# Patient Record
Sex: MALE | Race: WHITE | NOT HISPANIC OR LATINO | Employment: OTHER | URBAN - METROPOLITAN AREA
[De-identification: names, ages, dates, MRNs, and addresses within clinical notes are randomized per-mention and may not be internally consistent; named-entity substitution may affect disease eponyms.]

---

## 2017-04-07 DIAGNOSIS — I25.10 ATHEROSCLEROSIS OF NATIVE CORONARY ARTERY WITHOUT ANGINA PECTORIS, UNSPECIFIED WHETHER NATIVE OR TRANSPLANTED HEART: ICD-10-CM

## 2017-04-10 RX ORDER — PRAVASTATIN SODIUM 40 MG/1
TABLET ORAL
Qty: 30 TABLET | Refills: 0 | Status: SHIPPED | OUTPATIENT
Start: 2017-04-10 | End: 2017-05-06 | Stop reason: SDUPTHER

## 2017-04-26 ENCOUNTER — OFFICE VISIT (OUTPATIENT)
Dept: CARDIOLOGY | Facility: CLINIC | Age: 67
End: 2017-04-26
Payer: MEDICARE

## 2017-04-26 VITALS
DIASTOLIC BLOOD PRESSURE: 90 MMHG | BODY MASS INDEX: 38.22 KG/M2 | WEIGHT: 282.19 LBS | HEART RATE: 64 BPM | HEIGHT: 72 IN | SYSTOLIC BLOOD PRESSURE: 142 MMHG

## 2017-04-26 DIAGNOSIS — I25.10 CAD IN NATIVE ARTERY: ICD-10-CM

## 2017-04-26 DIAGNOSIS — I25.5 CARDIOMYOPATHY, ISCHEMIC: ICD-10-CM

## 2017-04-26 DIAGNOSIS — I10 ESSENTIAL HYPERTENSION: ICD-10-CM

## 2017-04-26 DIAGNOSIS — E78.5 DYSLIPIDEMIA: ICD-10-CM

## 2017-04-26 PROCEDURE — 99214 OFFICE O/P EST MOD 30 MIN: CPT | Mod: S$PBB,,, | Performed by: INTERNAL MEDICINE

## 2017-04-26 PROCEDURE — 99999 PR PBB SHADOW E&M-EST. PATIENT-LVL III: CPT | Mod: PBBFAC,,, | Performed by: INTERNAL MEDICINE

## 2017-04-26 PROCEDURE — 99213 OFFICE O/P EST LOW 20 MIN: CPT | Mod: PBBFAC,PO | Performed by: INTERNAL MEDICINE

## 2017-04-26 RX ORDER — ASPIRIN 81 MG/1
81 TABLET ORAL DAILY
COMMUNITY

## 2017-04-26 NOTE — PROGRESS NOTES
Subjective:    Patient ID:  Pj Malone is a 66 y.o. male who presents for follow-up of Coronary Artery Disease (6 month follow up); Hyperlipidemia; and Hypertension      HPI Comments: Since last visit Mr. Malone reports he is doing well with no new problems or new symptoms. He denies any chest pain, SOB, PND, orthopnea. He denies any palpitations, dizziness, syncope or pre-syncope. He denies rapid or sustained rhythms. He denies claudication, lower extremity edema. He denies exertional symptoms. He has gained 13#. He is asking about GI referral for colonoscopy. He does not have a PCP.      Review of Systems   Constitution: Positive for weight gain. Negative for weight loss.   HENT: Negative.    Eyes: Negative.    Cardiovascular: Negative for chest pain, claudication, cyanosis, dyspnea on exertion, irregular heartbeat, leg swelling, near-syncope, orthopnea (no PND), palpitations and syncope.   Respiratory: Negative for cough, hemoptysis, shortness of breath and snoring.    Endocrine: Negative.    Skin: Negative.    Musculoskeletal: Negative for joint pain, muscle cramps, muscle weakness and myalgias.   Gastrointestinal: Negative for diarrhea, hematemesis, nausea and vomiting.   Genitourinary: Negative.    Neurological: Negative for dizziness, focal weakness, light-headedness, loss of balance, numbness, paresthesias and seizures.   Psychiatric/Behavioral: Negative.         Objective:    Physical Exam   Constitutional: He is oriented to person, place, and time. He appears well-developed and well-nourished.   HENT:   Mouth/Throat: Oropharynx is clear and moist.   Eyes: Pupils are equal, round, and reactive to light.   Neck: Normal range of motion. No thyromegaly present.   Cardiovascular: Normal rate, regular rhythm, S1 normal, S2 normal, normal heart sounds, intact distal pulses and normal pulses.   No extrasystoles are present. PMI is not displaced.  Exam reveals no friction rub.    No murmur  heard.  Pulmonary/Chest: Effort normal and breath sounds normal. He has no wheezes. He has no rales. He exhibits no tenderness.   Abdominal: Soft. Bowel sounds are normal. He exhibits no distension and no mass. There is no tenderness.   Musculoskeletal: Normal range of motion. He exhibits no edema.   Neurological: He is alert and oriented to person, place, and time.   Skin: Skin is warm and dry.   Vitals reviewed.      Test(s) Reviewed  I have reviewed the following in detail:  [] Stress test   [] Angiography   [] Echocardiogram   [x] Labs   [] Other:         Assessment:       1. CAD in native artery - occluded RCA with collaterals; mild non-obstructive Dz in LAD, LCX - cath 2007    2. Cardiomyopathy, ischemic - EF 35-40% ---> 55% echo 03/2013    3. Essential hypertension    4. Dyslipidemia         Plan:       Cardiac status stable - no new symptoms  Continue present Rx  Will get GI referral   Referral for PCP  F/u here in 6 months

## 2017-04-26 NOTE — MR AVS SNAPSHOT
[facility]  [address]  [facility] [address]  Phone: [phone]                  Pj Malone   2017 9:40 AM   Office Visit    Description:  Male : 1950   Provider:  Claude Jacobs MD   Department:  [place] - Cardiology           Reason for Visit     Coronary Artery Disease     Hyperlipidemia     Hypertension           Diagnoses this Visit        Comments    CAD in native artery         Cardiomyopathy, ischemic         Essential hypertension         Dyslipidemia                To Do List           Goals (5 Years of Data)     None      Follow-Up and Disposition     Return in about 6 months (around 10/26/2017).      [facility]     [facility] On Call Nurse Care Line -  Assistance  Unless otherwise directed by your provider, please contact [facility], our nurse care line that is available for  assistance.     Registered nurses in the [facility] provide: appointment scheduling, clinical advisement, health education, and other advisory services.  Call: [phone] (toll free)               Medications           Message regarding Medications     Verify the changes and/or additions to your medication regime listed below are the same as discussed with your clinician today.  If any of these changes or additions are incorrect, please notify your healthcare provider.             Verify that the below list of medications is an accurate representation of the medications you are currently taking.  If none reported, the list may be blank. If incorrect, please contact your healthcare provider. Carry this list with you in case of emergency.           Current Medications     aspirin (ECOTRIN) 81 MG EC tablet Take 81 mg by mouth once daily.    lisinopril (PRINIVIL,ZESTRIL) 20 MG tablet Take 1 tablet (20 mg total) by mouth once daily.    metoprolol succinate (TOPROL-XL) 25 MG 24 hr tablet Take 1 tablet (25 mg total) by mouth once daily.    pravastatin  (PRAVACHOL) 40 MG tablet TAKE 1 TABLET(40 MG) BY MOUTH EVERY DAY           Clinical Reference Information           Your Vitals Were     BP Pulse Height Weight BMI    142/90 64 6' (1.829 m) 128 kg (282 lb 3 oz) 38.27 kg/m2      Blood Pressure          Most Recent Value    BP  (!)  142/90      Allergies as of 4/26/2017     Poison Ivy [Vit E-nonoxynol 9-aloe Vera]      Immunizations Administered on Date of Encounter - 4/26/2017     None      Language Assistance Services     ATTENTION: Language assistance services are available, free of charge. Please call [phone].      ATENCIÓN: Si habla español, tiene a helton disposición servicios gratuitos de asistencia lingüística. Llame al [phone].     CHÚ Ý: N?u b?n nói Ti?ng Vi?t, có các d?ch v? h? tr? ngôn ng? mi?n phí dành cho b?n. G?i s? [phone].         [facility] complies with applicable Federal civil rights laws and does not discriminate on the basis of race, color, national origin, age, disability, or sex.

## 2017-04-27 ENCOUNTER — OFFICE VISIT (OUTPATIENT)
Dept: GASTROENTEROLOGY | Facility: CLINIC | Age: 67
End: 2017-04-27
Payer: MEDICARE

## 2017-04-27 VITALS
RESPIRATION RATE: 20 BRPM | HEIGHT: 72 IN | WEIGHT: 284.81 LBS | SYSTOLIC BLOOD PRESSURE: 118 MMHG | BODY MASS INDEX: 38.58 KG/M2 | HEART RATE: 71 BPM | DIASTOLIC BLOOD PRESSURE: 82 MMHG

## 2017-04-27 DIAGNOSIS — Z79.01 ANTICOAGULANT LONG-TERM USE: ICD-10-CM

## 2017-04-27 DIAGNOSIS — Z12.11 SCREENING FOR COLON CANCER: Primary | ICD-10-CM

## 2017-04-27 DIAGNOSIS — R03.0 ELEVATED BLOOD PRESSURE READING: ICD-10-CM

## 2017-04-27 PROCEDURE — 99213 OFFICE O/P EST LOW 20 MIN: CPT | Mod: PBBFAC,PO | Performed by: NURSE PRACTITIONER

## 2017-04-27 PROCEDURE — 99213 OFFICE O/P EST LOW 20 MIN: CPT | Mod: S$PBB,,, | Performed by: NURSE PRACTITIONER

## 2017-04-27 PROCEDURE — 99999 PR PBB SHADOW E&M-EST. PATIENT-LVL III: CPT | Mod: PBBFAC,,, | Performed by: NURSE PRACTITIONER

## 2017-04-27 NOTE — PATIENT INSTRUCTIONS
Colonoscopy     A camera attached to a flexible tube with a viewing lens is used to take video pictures.     Colonoscopy is a test to view the inside of your lower digestive tract (colon and rectum). Sometimes it can show the last part of the small intestine (ileum). During the test, small pieces of tissue may be removed for testing. This is called a biopsy. Small growths, such as polyps, may also be removed.   Why is colonoscopy done?  The test is done to help look for colon cancer. And it can help find the source of abdominal pain, bleeding, and changes in bowel habits. It may be needed once a year, depending on factors such as your:  · Age  · Health history  · Family health history  · Symptoms  · Results from any prior colonoscopy  Risks and possible complications  These include:  · Bleeding               · A puncture or tear in the colon   · Risks of anesthesia  · A cancer lesion not being seen  Getting ready   To prepare for the test:  · Talk with your healthcare provider about the risks of the test (see below). Also ask your healthcare provider about alternatives to the test.  · Tell your healthcare provider about any medicines you take. Also tell him or her about any health conditions you may have.  · Make sure your rectum and colon are empty for the test. Follow the diet and bowel prep instructions exactly. If you dont, the test may need to be rescheduled.  · Plan for a friend or family member to drive you home after the test.     Colonoscopy provides an inside view of the entire colon.     You may discuss the results with your doctor right away or at a future visit.  During the test   The test is usually done in the hospital on an outpatient basis. This means you go home the same day. The procedure takes about 30 minutes. During that time:  · You are given relaxing (sedating) medicine through an IV line. You may be drowsy, or fully asleep.  · The healthcare provider will first give you a physical exam to  check for anal and rectal problems.  · Then the anus is lubricated and the scope inserted.  · If you are awake, you may have a feeling similar to needing to have a bowel movement. You may also feel pressure as air is pumped into the colon. Its OK to pass gas during the procedure.  · Biopsy, polyp removal, or other treatments may be done during the test.  After the test   You may have gas right after the test. It can help to try to pass it to help prevent later bloating. Your healthcare provider may discuss the results with you right away. Or you may need to schedule a follow-up visit to talk about the results. After the test, you can go back to your normal eating and other activities. You may be tired from the sedation and need to rest for a few hours.  Date Last Reviewed: 11/1/2016  © 3858-3892 [employer]. [address] [place] [address], [place] [address]. All rights reserved. This information is not intended as a substitute for professional medical care. Always follow your healthcare professional's instructions.

## 2017-04-27 NOTE — PROGRESS NOTES
Subjective:       Patient ID: Pj Malone is a 66 y.o. male Body mass index is 38.63 kg/(m^2).    Chief Complaint: Colonoscopy (screening)    This patient is new to me.  Referring Provider:  Dr. Sanchez for screening for colon cancer    HPI Comments: Patient seen for colorectal cancer screening, average risk, age appropriate, first occurrence, with no associated signs/symptoms (see ROS), and no alleviating/exacerbating factors. Denies family history of colon cancer/polyps.    Review of Systems   Constitutional: Negative for appetite change and unexpected weight change.   HENT: Negative for trouble swallowing.    Respiratory: Negative for shortness of breath.    Cardiovascular: Negative for chest pain.   Gastrointestinal: Negative for abdominal pain, anal bleeding, blood in stool, constipation, diarrhea, nausea and vomiting.       Past Medical History:   Diagnosis Date    Coronary artery disease     Former smoker     Hyperlipidemia     Hypertension      Past Surgical History:   Procedure Laterality Date    CARDIAC CATHETERIZATION      JOINT REPLACEMENT Left 2007    left knee    TONSILLECTOMY       Family History   Problem Relation Age of Onset    Heart disease Mother     Hypertension Mother     Cancer Father      neck    Heart disease Father     Heart disease Sister     Mental illness Son     No Known Problems Sister     No Known Problems Sister     Colon polyps Neg Hx     Colon cancer Neg Hx     Crohn's disease Neg Hx     Ulcerative colitis Neg Hx     Esophageal cancer Neg Hx     Stomach cancer Neg Hx      Wt Readings from Last 10 Encounters:   04/27/17 129.2 kg (284 lb 13.4 oz)   04/26/17 128 kg (282 lb 3 oz)   10/31/16 122 kg (269 lb)   10/27/16 122.4 kg (269 lb 13.5 oz)   04/29/16 117.1 kg (258 lb 2.5 oz)   10/30/15 117.5 kg (259 lb 0.7 oz)   10/29/15 117.9 kg (259 lb 14.8 oz)   04/28/15 117.9 kg (259 lb 14.4 oz)   04/28/15 118.2 kg (260 lb 9.6 oz)   10/23/14 112.8 kg (248 lb 9.6 oz)      Objective:      Physical Exam   Constitutional: He is oriented to person, place, and time. He appears well-developed and well-nourished. No distress.   HENT:   Mouth/Throat: Oropharynx is clear and moist and mucous membranes are normal. No oral lesions. No oropharyngeal exudate.   Eyes: Conjunctivae are normal. Pupils are equal, round, and reactive to light. No scleral icterus.   Neck: Neck supple. No tracheal deviation present.   Cardiovascular: Normal rate.    Pulmonary/Chest: Effort normal and breath sounds normal. No respiratory distress. He has no wheezes.   Abdominal: Soft. Normal appearance and bowel sounds are normal. He exhibits no distension, no abdominal bruit and no mass. There is no hepatosplenomegaly. There is no tenderness. There is no rigidity, no rebound, no guarding, no tenderness at McBurney's point and negative Modi's sign. No hernia.   Lymphadenopathy:     He has no cervical adenopathy.   Neurological: He is alert and oriented to person, place, and time.   Skin: Skin is warm and dry. No rash noted. He is not diaphoretic. No erythema. No pallor.   Non-jaundiced   Psychiatric: He has a normal mood and affect. His behavior is normal.   Nursing note and vitals reviewed.      Assessment:       1. Screening for colon cancer    2. Elevated blood pressure reading    3. Anticoagulant long-term use        Plan:       Screening for colon cancer  - schedule Colonoscopy, discussed procedure with the patient, verbalized understanding    Elevated blood pressure reading  - repeated BP, repeat was lower  - instructed patient to monitor blood pressure at home and follow-up with PCP &/or cardiologist  - patient denies headache, chest pain, shortness of breath, or blurred vision, educated patient that if blood pressure remains elevated or symptoms occur to go to ER.    Anticoagulant long-term use  - patient is on blood-thinner(s)=aspirin, informed patient that it will likely need to be held for endoscopy,  nurse will confirm with cardiologist/PCP.    Return in about 3 months (around 7/27/2017), or if symptoms worsen or fail to improve.    If no improvement in symptoms or symptoms worsen, call/follow-up at clinic or go to ER.

## 2017-04-27 NOTE — LETTER
April 27, 2017      Claude Jacobs MD  [address] [place] [address]           [place] [facility]enterology  [address] [place] [address]  Phone: [phone]          Patient: Pj Malone   MR Number: [id number]   YOB: 1950   Date of Visit: 4/27/2017       Dear Dr. Claude Jacobs:    Thank you for referring Pj Malone to me for evaluation. Attached you will find relevant portions of my assessment and plan of care.    If you have questions, please do not hesitate to call me. I look forward to following Pj Malone along with you.    Sincerely,    Joy Olson, [facility]    Enclosure  CC:  No Recipients    If you would like to receive this communication electronically, please contact [email or web address] or [phone] to request more information on [employer] access.    For providers and/or their staff who would like to refer a patient to [facility], please contact us through our one-stop-shop provider referral line, [facility], at [phone].    If you feel you have received this communication in error or would no longer like to receive these types of communications, please e-mail [email or web address]

## 2017-04-27 NOTE — MR AVS SNAPSHOT
[facility]  [address] [facility]  [facility] [address]  Phone: [phone]                  Pj Malone   2017 9:00 AM   Office Visit    Description:  Male : 1950   Provider:  BROOKE Ramirez   Department:  [facility]           Reason for Visit     Colonoscopy           Diagnoses this Visit        Comments    Screening for colon cancer    -  Primary            To Do List           Future Appointments        Provider Department Dept Phone    2017 3:00 PM NAVA López MD [facility] [phone]      Goals (5 Years of Data)     None      Follow-Up and Disposition     Return in about 3 months (around 2017), or if symptoms worsen or fail to improve.      [facility] On Call     [facility] Nurse Care Line -  Assistance  Unless otherwise directed by your provider, please contact [facility], our nurse care line that is available for  assistance.     Registered nurses in the [facility] provide: appointment scheduling, clinical advisement, health education, and other advisory services.  Call: [phone] (toll free)               Medications           Message regarding Medications     Verify the changes and/or additions to your medication regime listed below are the same as discussed with your clinician today.  If any of these changes or additions are incorrect, please notify your healthcare provider.             Verify that the below list of medications is an accurate representation of the medications you are currently taking.  If none reported, the list may be blank. If incorrect, please contact your healthcare provider. Carry this list with you in case of emergency.           Current Medications     aspirin (ECOTRIN) 81 MG EC tablet Take 81 mg by mouth once daily.    lisinopril (PRINIVIL,ZESTRIL) 20 MG tablet Take 1 tablet (20 mg total) by mouth once daily.    metoprolol succinate (TOPROL-XL) 25  MG 24 hr tablet Take 1 tablet (25 mg total) by mouth once daily.    pravastatin (PRAVACHOL) 40 MG tablet TAKE 1 TABLET(40 MG) BY MOUTH EVERY DAY           Clinical Reference Information           Your Vitals Were     BP Pulse Resp Height Weight BMI    122/98 71 20 6' (1.829 m) 129.2 kg (284 lb 13.4 oz) 38.63 kg/m2      Blood Pressure          Most Recent Value    BP  (!)  122/98      Allergies as of 4/27/2017     Poison Ivy [Vit E-nonoxynol 9-aloe Vera]      Immunizations Administered on Date of Encounter - 4/27/2017     None      Instructions      Colonoscopy     A camera attached to a flexible tube with a viewing lens is used to take video pictures.     Colonoscopy is a test to view the inside of your lower digestive tract (colon and rectum). Sometimes it can show the last part of the small intestine (ileum). During the test, small pieces of tissue may be removed for testing. This is called a biopsy. Small growths, such as polyps, may also be removed.   Why is colonoscopy done?  The test is done to help look for colon cancer. And it can help find the source of abdominal pain, bleeding, and changes in bowel habits. It may be needed once a year, depending on factors such as your:  · Age  · Health history  · Family health history  · Symptoms  · Results from any prior colonoscopy  Risks and possible complications  These include:  · Bleeding               · A puncture or tear in the colon   · Risks of anesthesia  · A cancer lesion not being seen  Getting ready   To prepare for the test:  · Talk with your healthcare provider about the risks of the test (see below). Also ask your healthcare provider about alternatives to the test.  · Tell your healthcare provider about any medicines you take. Also tell him or her about any health conditions you may have.  · Make sure your rectum and colon are empty for the test. Follow the diet and bowel prep instructions exactly. If you dont, the test may need to be  rescheduled.  · Plan for a friend or family member to drive you home after the test.     Colonoscopy provides an inside view of the entire colon.     You may discuss the results with your doctor right away or at a future visit.  During the test   The test is usually done in the hospital on an outpatient basis. This means you go home the same day. The procedure takes about 30 minutes. During that time:  · You are given relaxing (sedating) medicine through an IV line. You may be drowsy, or fully asleep.  · The healthcare provider will first give you a physical exam to check for anal and rectal problems.  · Then the anus is lubricated and the scope inserted.  · If you are awake, you may have a feeling similar to needing to have a bowel movement. You may also feel pressure as air is pumped into the colon. Its OK to pass gas during the procedure.  · Biopsy, polyp removal, or other treatments may be done during the test.  After the test   You may have gas right after the test. It can help to try to pass it to help prevent later bloating. Your healthcare provider may discuss the results with you right away. Or you may need to schedule a follow-up visit to talk about the results. After the test, you can go back to your normal eating and other activities. You may be tired from the sedation and need to rest for a few hours.  Date Last Reviewed: 11/1/2016  © 6394-6970 [employer]. [address] [place] [address]. All rights reserved. This information is not intended as a substitute for professional medical care. Always follow your healthcare professional's instructions.             Language Assistance Services     ATTENTION: Language assistance services are available, free of charge. Please call [phone].      ATENCIÓN: Si jose m miles, tiene a helton disposición servicios gratuitos de asistencia lingüística. Llame al [phone].     DENILSON Ý: N?u b?n nói Ti?ng Vi?t, có các d?ch v? h? tr? ngôn ng?  mi?n phí dành cho b?n. G?i s? [phone].         [facility] complies with applicable Federal civil rights laws and does not discriminate on the basis of race, color, national origin, age, disability, or sex.

## 2017-05-06 DIAGNOSIS — I25.10 ATHEROSCLEROSIS OF NATIVE CORONARY ARTERY WITHOUT ANGINA PECTORIS, UNSPECIFIED WHETHER NATIVE OR TRANSPLANTED HEART: ICD-10-CM

## 2017-05-08 RX ORDER — PRAVASTATIN SODIUM 40 MG/1
TABLET ORAL
Qty: 30 TABLET | Refills: 0 | Status: SHIPPED | OUTPATIENT
Start: 2017-05-08 | End: 2017-06-06 | Stop reason: SDUPTHER

## 2017-05-25 ENCOUNTER — ANESTHESIA (OUTPATIENT)
Dept: ENDOSCOPY | Facility: HOSPITAL | Age: 67
End: 2017-05-25
Payer: MEDICARE

## 2017-05-25 ENCOUNTER — ANESTHESIA EVENT (OUTPATIENT)
Dept: ENDOSCOPY | Facility: HOSPITAL | Age: 67
End: 2017-05-25
Payer: MEDICARE

## 2017-05-25 ENCOUNTER — HOSPITAL ENCOUNTER (OUTPATIENT)
Facility: HOSPITAL | Age: 67
Discharge: HOME | End: 2017-05-25
Attending: INTERNAL MEDICINE | Admitting: INTERNAL MEDICINE
Payer: MEDICARE

## 2017-05-25 VITALS
SYSTOLIC BLOOD PRESSURE: 111 MMHG | HEART RATE: 75 BPM | OXYGEN SATURATION: 97 % | DIASTOLIC BLOOD PRESSURE: 73 MMHG | BODY MASS INDEX: 37.93 KG/M2 | WEIGHT: 280 LBS | RESPIRATION RATE: 15 BRPM | HEIGHT: 72 IN | RESPIRATION RATE: 16 BRPM | TEMPERATURE: 98 F

## 2017-05-25 DIAGNOSIS — Z12.11 SCREEN FOR COLON CANCER: ICD-10-CM

## 2017-05-25 PROCEDURE — 63600175 PHARM REV CODE 636 W HCPCS: Mod: PO | Performed by: NURSE ANESTHETIST, CERTIFIED REGISTERED

## 2017-05-25 PROCEDURE — 27201089 HC SNARE, DISP (ANY): Mod: PO | Performed by: INTERNAL MEDICINE

## 2017-05-25 PROCEDURE — 45385 COLONOSCOPY W/LESION REMOVAL: CPT | Mod: PO | Performed by: INTERNAL MEDICINE

## 2017-05-25 PROCEDURE — 37000008 HC ANESTHESIA 1ST 15 MINUTES: Mod: PO | Performed by: INTERNAL MEDICINE

## 2017-05-25 PROCEDURE — 45385 COLONOSCOPY W/LESION REMOVAL: CPT | Mod: PT,,, | Performed by: INTERNAL MEDICINE

## 2017-05-25 PROCEDURE — 25000003 PHARM REV CODE 250: Mod: PO | Performed by: INTERNAL MEDICINE

## 2017-05-25 PROCEDURE — 25000003 PHARM REV CODE 250: Mod: PO | Performed by: NURSE ANESTHETIST, CERTIFIED REGISTERED

## 2017-05-25 PROCEDURE — 37000009 HC ANESTHESIA EA ADD 15 MINS: Mod: PO | Performed by: INTERNAL MEDICINE

## 2017-05-25 PROCEDURE — 88305 TISSUE EXAM BY PATHOLOGIST: CPT | Performed by: PATHOLOGY

## 2017-05-25 PROCEDURE — 88305 TISSUE EXAM BY PATHOLOGIST: CPT | Mod: 26,,, | Performed by: PATHOLOGY

## 2017-05-25 PROCEDURE — D9220A PRA ANESTHESIA: Mod: PT,CRNA,, | Performed by: NURSE ANESTHETIST, CERTIFIED REGISTERED

## 2017-05-25 PROCEDURE — D9220A PRA ANESTHESIA: Mod: PT,ANES,, | Performed by: ANESTHESIOLOGY

## 2017-05-25 RX ORDER — SODIUM CHLORIDE 0.9 % (FLUSH) 0.9 %
3 SYRINGE (ML) INJECTION
Status: DISCONTINUED | OUTPATIENT
Start: 2017-05-25 | End: 2017-05-25 | Stop reason: HOSPADM

## 2017-05-25 RX ORDER — PROPOFOL 10 MG/ML
VIAL (ML) INTRAVENOUS
Status: DISCONTINUED | OUTPATIENT
Start: 2017-05-25 | End: 2017-05-25

## 2017-05-25 RX ORDER — SODIUM CHLORIDE, SODIUM LACTATE, POTASSIUM CHLORIDE, CALCIUM CHLORIDE 600; 310; 30; 20 MG/100ML; MG/100ML; MG/100ML; MG/100ML
INJECTION, SOLUTION INTRAVENOUS CONTINUOUS
Status: DISCONTINUED | OUTPATIENT
Start: 2017-05-26 | End: 2017-05-25 | Stop reason: HOSPADM

## 2017-05-25 RX ORDER — LIDOCAINE HCL/PF 100 MG/5ML
SYRINGE (ML) INTRAVENOUS
Status: DISCONTINUED | OUTPATIENT
Start: 2017-05-25 | End: 2017-05-25

## 2017-05-25 RX ADMIN — PROPOFOL 100 MG: 10 INJECTION, EMULSION INTRAVENOUS at 08:05

## 2017-05-25 RX ADMIN — PROPOFOL 30 MG: 10 INJECTION, EMULSION INTRAVENOUS at 08:05

## 2017-05-25 RX ADMIN — LIDOCAINE HYDROCHLORIDE 100 MG: 20 INJECTION, SOLUTION INTRAVENOUS at 08:05

## 2017-05-25 RX ADMIN — PROPOFOL 50 MG: 10 INJECTION, EMULSION INTRAVENOUS at 08:05

## 2017-05-25 RX ADMIN — PROPOFOL 30 MG: 10 INJECTION, EMULSION INTRAVENOUS at 09:05

## 2017-05-25 RX ADMIN — SODIUM CHLORIDE, SODIUM LACTATE, POTASSIUM CHLORIDE, AND CALCIUM CHLORIDE: .6; .31; .03; .02 INJECTION, SOLUTION INTRAVENOUS at 08:05

## 2017-05-25 NOTE — DISCHARGE INSTRUCTIONS
Understanding Colon and Rectal Polyps    The colon (also called the large intestine) is a muscular tube that forms the last part of the digestive tract. It absorbs water and stores food waste. The colon is about 4 to 6 feet long. The rectum is the last 6 inches of the colon. The colon and rectum have a smooth lining composed of millions of cells. Changes in these cells can lead to growths in the colon that can become cancerous and should be removed. Multiple tests are available to screen for colon cancer, but the colonoscopy is the most recommended test. During colonoscopy, these polyps can be removed. How often you need this test depends on many things including your condition, your family history, symptoms, and what the findings were at the previous colonoscopy.   When the colon lining changes  Changes that happen in the cells that line the colon or rectum can lead to growths called polyps. Over a period of years, polyps can turn cancerous. Removing polyps early may prevent cancer from ever forming.  Polyps  Polyps are fleshy clumps of tissue that form on the lining of the colon or rectum. Small polyps are usually benign (not cancerous). However, over time, cells in a polyp can change and become cancerous. Certain types of polyps known as adenomatous polyps are premalignant. The risk for invasive cancer increases with the size of the polyp and certain cell and gene features. This means that they can become cancerous if they're not removed. Hyperplastic polyps are benign. They can grow quite large and not turn cancerous.   Cancer  Almost all colorectal cancers start when polyp cells begin growing abnormally. As a cancerous tumor grows, it may involve more and more of the colon or rectum. In time, cancer can also grow beyond the colon or rectum and spread to nearby organs or to glands called lymph nodes. The cells can also travel to other parts of the body. This is known as metastasis. The earlier a cancerous  tumor is removed, the better the chance of preventing its spread.    Date Last Reviewed: 8/1/2016  © 5406-8416 [employer]. [address] [place] [address]. All rights reserved. This information is not intended as a substitute for professional medical care. Always follow your healthcare professional's instructions.        Procedural Sedation (Adult)  You have been given medicine by vein to make you sleep during your surgery. This may have included both a pain medicine and sleeping medicine. Most of the effects have worn off. But you may still have some drowsiness for the next 6 to 8 hours.  Home care  Follow these guidelines when you get home:  · For the next 8 hours, you should be watched by a responsible adult. This person should make sure your condition is not getting worse.  · Don't take any medicine by mouth for pain or for sleep during the next 4 hours. These might react with the medicines you were given in the hospital. This could cause a much stronger response than usual.  · Don't drink any alcohol for the next 24 hours.  · Don't drive, operate dangerous machinery, or make important business or personal decisions during the next 24 hours.  Follow-up care  Follow up with your healthcare provider if you are not alert and back to your usual level of activity within 12 hours.  When to seek medical advice  Call your healthcare provider right away if any of these occur:  · Drowsiness gets worse  · Weakness or dizziness gets worse  · Repeated vomiting  · You cannot be awakened   Date Last Reviewed: 10/18/2016  © 5298-9047 [employer]. [address] [place] [address]. All rights reserved. This information is not intended as a substitute for professional medical care. Always follow your healthcare professional's instructions.

## 2017-05-25 NOTE — DISCHARGE SUMMARY
Discharge Note  Short Stay      SUMMARY     Admit Date: 5/25/2017    Attending Physician: Viraj Peters MD     Discharge Physician: Viraj Peters MD    Discharge Date: 5/25/2017 9:11 AM    Final Diagnosis: Screen for colon cancer [Z12.11]    Disposition: HOME OR SELF CARE    Patient Instructions:   Current Discharge Medication List      CONTINUE these medications which have NOT CHANGED    Details   aspirin (ECOTRIN) 81 MG EC tablet Take 81 mg by mouth once daily.      lisinopril (PRINIVIL,ZESTRIL) 20 MG tablet Take 1 tablet (20 mg total) by mouth once daily.  Qty: 30 tablet, Refills: 6    Associated Diagnoses: Essential hypertension      metoprolol succinate (TOPROL-XL) 25 MG 24 hr tablet Take 1 tablet (25 mg total) by mouth once daily.  Qty: 30 tablet, Refills: 6      pravastatin (PRAVACHOL) 40 MG tablet TAKE 1 TABLET(40 MG) BY MOUTH EVERY DAY  Qty: 30 tablet, Refills: 0    Associated Diagnoses: Atherosclerosis of native coronary artery without angina pectoris, unspecified whether native or transplanted heart             Discharge Procedure Orders (must include Diet, Follow-up, Activity)    Follow Up:  Follow up with PCP as previously scheduled  Resume routine diet.  Activity as tolerated.    No driving day of procedure.

## 2017-05-25 NOTE — TRANSFER OF CARE
Anesthesia Transfer of Care Note    Patient: Pj Malone    Procedure(s) Performed: Procedure(s) (LRB):  COLONOSCOPY (N/A)    Patient location: PACU    Anesthesia Type: general    Transport from OR: Transported from OR on room air with adequate spontaneous ventilation    Post pain: adequate analgesia    Post assessment: no apparent anesthetic complications and tolerated procedure well    Post vital signs: stable    Level of consciousness: awake and sedated    Nausea/Vomiting: no nausea/vomiting    Complications: none          Last vitals:   Visit Vitals  BP (!) 143/94 (BP Location: Right arm, Patient Position: Lying, BP Method: Automatic)   Pulse 76   Temp 36.6 °C (97.8 °F) (Temporal)   Resp 16   Ht 6' (1.829 m)   Wt 127 kg (280 lb)   SpO2 96%   BMI 37.97 kg/m²

## 2017-05-25 NOTE — ANESTHESIA PREPROCEDURE EVALUATION
05/25/2017  Pj Malone is a 66 y.o., male.    Anesthesia Evaluation      I have reviewed the Medications.     Review of Systems  Anesthesia Hx:  No problems with previous Anesthesia   Social:  Non-Smoker, No Alcohol Use    Cardiovascular:   Hypertension CAD   hyperlipidemia    Pulmonary:  Pulmonary Normal    Renal/:  Renal/ Normal     Hepatic/GI:  Hepatic/GI Normal    Musculoskeletal:  Spine Disorders: lumbar Disc disease and Degenerative disease    Neurological:  Neurology Normal    Endocrine:  Endocrine Normal        Physical Exam  General:  Obesity    Airway/Jaw/Neck:  Airway Findings: Mouth Opening: Normal General Airway Assessment: Adult  Mallampati: II  Jaw/Neck Findings:  Neck ROM: Extension Decreased, Mild       Chest/Lungs:  Chest/Lungs Findings: Clear to auscultation, Normal Respiratory Rate     Heart/Vascular:  Heart Findings: Rate: Normal  Rhythm: Regular Rhythm  Sounds: Normal  Heart murmur: negative Vascular Findings: Normal (No carotid bruits.)       Mental Status:  Mental Status Findings:  Cooperative, Alert and Oriented         Anesthesia Plan  Type of Anesthesia, risks & benefits discussed:  Anesthesia Type:  general  Patient's Preference:   Intra-op Monitoring Plan:   Intra-op Monitoring Plan Comments:   Post Op Pain Control Plan:   Post Op Pain Control Plan Comments:   Induction:   IV  Beta Blocker:  Patient is on a Beta-Blocker and has received one dose within the past 24 hours (No further documentation required).       Informed Consent: Patient understands risks and agrees with Anesthesia plan.  Questions answered. Anesthesia consent signed with patient.  ASA Score: 3     Day of Surgery Review of History & Physical:        Anesthesia Plan Notes: Propofol general.        Ready For Surgery From Anesthesia Perspective.

## 2017-05-25 NOTE — ANESTHESIA POSTPROCEDURE EVALUATION
Anesthesia Post Evaluation    Patient: Pj Malone    Procedure(s) Performed: Procedure(s) (LRB):  COLONOSCOPY (N/A)    Final Anesthesia Type: general  Patient location during evaluation: PACU  Patient participation: Yes- Able to Participate  Level of consciousness: awake and alert  Post-procedure vital signs: reviewed and stable  Pain management: adequate  Airway patency: patent  PONV status at discharge: No PONV  Anesthetic complications: no      Cardiovascular status: hemodynamically stable and blood pressure returned to baseline  Respiratory status: unassisted, spontaneous ventilation and room air  Hydration status: euvolemic  Follow-up not needed.        Visit Vitals  /73 (BP Location: Left arm, Patient Position: Sitting, BP Method: Automatic)   Pulse 75   Temp 36.5 °C (97.7 °F) (Skin)   Resp 16   Ht 6' (1.829 m)   Wt 127 kg (280 lb)   SpO2 97%   BMI 37.97 kg/m²       Pain/Litzy Score: Pain Assessment Performed: Yes (5/25/2017  9:26 AM)  Presence of Pain: denies (5/25/2017  9:26 AM)  Litzy Score: 10 (5/25/2017  9:26 AM)

## 2017-05-25 NOTE — H&P
History & Physical - Short Stay  Gastroenterology      SUBJECTIVE:     Procedure: Colonoscopy    Chief Complaint/Indication for Procedure: Screening    PTA Medications   Medication Sig    aspirin (ECOTRIN) 81 MG EC tablet Take 81 mg by mouth once daily.    lisinopril (PRINIVIL,ZESTRIL) 20 MG tablet Take 1 tablet (20 mg total) by mouth once daily.    metoprolol succinate (TOPROL-XL) 25 MG 24 hr tablet Take 1 tablet (25 mg total) by mouth once daily.    pravastatin (PRAVACHOL) 40 MG tablet TAKE 1 TABLET(40 MG) BY MOUTH EVERY DAY       Review of patient's allergies indicates:   Allergen Reactions    Poison ivy [vit e-nonoxynol 9-aloe vera] Anaphylaxis        Past Medical History:   Diagnosis Date    Coronary artery disease     Former smoker     Hyperlipidemia     Hypertension      Past Surgical History:   Procedure Laterality Date    CARDIAC CATHETERIZATION      JOINT REPLACEMENT Left 2007    left knee    TONSILLECTOMY       Family History   Problem Relation Age of Onset    Heart disease Mother     Hypertension Mother     Cancer Father      neck    Heart disease Father     Heart disease Sister     Mental illness Son     No Known Problems Sister     No Known Problems Sister     Colon polyps Neg Hx     Colon cancer Neg Hx     Crohn's disease Neg Hx     Ulcerative colitis Neg Hx     Esophageal cancer Neg Hx     Stomach cancer Neg Hx      Social History   Substance Use Topics    Smoking status: Former Smoker     Packs/day: 0.50     Years: 6.00    Smokeless tobacco: Never Used    Alcohol use Yes      Comment: occas- 12pack/month         OBJECTIVE:     Vital Signs (Most Recent)  Temp: 97.8 °F (36.6 °C) (05/25/17 0751)  Pulse: 76 (05/25/17 0751)  Resp: 16 (05/25/17 0751)  BP: (!) 143/94 (05/25/17 0751)  SpO2: 96 % (05/25/17 0751)    Physical Exam                                                        GENERAL:  Comfortable, in no acute distress.                                 HEENT EXAM:   Nonicteric.  No adenopathy.  Oropharynx is clear.               NECK:  Supple.                                                               LUNGS:  Clear.                                                               CARDIAC:  Regular rate and rhythm.  S1, S2.  No murmur.                      ABDOMEN:  Soft, positive bowel sounds, nontender.  No hepatosplenomegaly or masses.  No rebound or guarding.                                             EXTREMITIES:  No edema.     MENTAL STATUS:  Normal, alert and oriented.      ASSESSMENT/PLAN:     Assessment: Colorectal cancer screening    Plan: Colonoscopy    Anesthesia Plan: General    ASA Grade: ASA 2 - Patient with mild systemic disease with no functional limitations    MALLAMPATI SCORE:  I (soft palate, uvula, fauces, and tonsillar pillars visible)

## 2017-06-05 ENCOUNTER — PATIENT OUTREACH (OUTPATIENT)
Dept: ADMINISTRATIVE | Facility: HOSPITAL | Age: 67
End: 2017-06-05

## 2017-06-05 NOTE — LETTER
June 5, 2017    Pj OLSON Faisal  [address] [facility] [address]             [facility]  [address]  [facility] [address]  Phone: [phone] Dear Mr. Malone:    [facility] is committed to your overall health.  To help you get the most out of each of your visits, we will review your information to make sure you are up to date on all of your recommended tests and/or procedures.      Dr. López         has found that you may be due for:    One-time Hepatitis C Screening lab test(a viral condition that can harm the liver)      If you have had any of the above done at another facility, please bring the records or information with you so that your record at [facility] will be complete.     If you are currently taking medication, please bring it with you to your appointment for review.      If you have any questions or concerns, please don't hesitate to call.    Sincerely,    Shanique Wilson  Clinical Care Coordinator  [facility] [address] [place] [address]  Phone: [phone]   Fax: [phone]

## 2017-06-06 DIAGNOSIS — I25.10 ATHEROSCLEROSIS OF NATIVE CORONARY ARTERY WITHOUT ANGINA PECTORIS, UNSPECIFIED WHETHER NATIVE OR TRANSPLANTED HEART: ICD-10-CM

## 2017-06-07 RX ORDER — PRAVASTATIN SODIUM 40 MG/1
TABLET ORAL
Qty: 30 TABLET | Refills: 0 | Status: SHIPPED | OUTPATIENT
Start: 2017-06-07 | End: 2017-07-11 | Stop reason: SDUPTHER

## 2017-06-19 ENCOUNTER — OFFICE VISIT (OUTPATIENT)
Dept: FAMILY MEDICINE | Facility: CLINIC | Age: 67
End: 2017-06-19
Payer: MEDICARE

## 2017-06-19 VITALS
HEIGHT: 72 IN | HEART RATE: 78 BPM | SYSTOLIC BLOOD PRESSURE: 114 MMHG | WEIGHT: 276 LBS | DIASTOLIC BLOOD PRESSURE: 86 MMHG | BODY MASS INDEX: 37.38 KG/M2

## 2017-06-19 DIAGNOSIS — E78.5 DYSLIPIDEMIA: ICD-10-CM

## 2017-06-19 DIAGNOSIS — I10 ESSENTIAL HYPERTENSION: Primary | ICD-10-CM

## 2017-06-19 DIAGNOSIS — Z11.59 NEED FOR HEPATITIS C SCREENING TEST: ICD-10-CM

## 2017-06-19 DIAGNOSIS — Z12.5 SCREENING FOR PROSTATE CANCER: ICD-10-CM

## 2017-06-19 PROBLEM — Z12.11 SCREEN FOR COLON CANCER: Status: RESOLVED | Noted: 2017-05-25 | Resolved: 2017-06-19

## 2017-06-19 PROCEDURE — 99999 PR PBB SHADOW E&M-EST. PATIENT-LVL III: CPT | Mod: PBBFAC,,, | Performed by: FAMILY MEDICINE

## 2017-06-19 PROCEDURE — 99213 OFFICE O/P EST LOW 20 MIN: CPT | Mod: PBBFAC,PO | Performed by: FAMILY MEDICINE

## 2017-06-19 PROCEDURE — 99213 OFFICE O/P EST LOW 20 MIN: CPT | Mod: S$PBB,,, | Performed by: FAMILY MEDICINE

## 2017-06-19 RX ORDER — TETANUS TOXOID, REDUCED DIPHTHERIA TOXOID AND ACELLULAR PERTUSSIS VACCINE, ADSORBED 5; 2.5; 8; 8; 2.5 [IU]/.5ML; [IU]/.5ML; UG/.5ML; UG/.5ML; UG/.5ML
SUSPENSION INTRAMUSCULAR
COMMUNITY
Start: 2017-04-07 | End: 2017-06-19

## 2017-06-19 RX ORDER — GABAPENTIN 300 MG/1
300 CAPSULE ORAL NIGHTLY PRN
Qty: 90 CAPSULE | Refills: 1 | Status: SHIPPED | OUTPATIENT
Start: 2017-06-19 | End: 2017-12-13 | Stop reason: SDUPTHER

## 2017-06-19 NOTE — PROGRESS NOTES
Subjective:       Patient ID: Pj Malone is a 66 y.o. male.    Chief Complaint: Establish Care    Here to establish care.  Has been following with cardiology.  States doing well overall.  Would like refill gabapentin for radiating leg pain.      Review of Systems   Constitutional: Negative for chills, fatigue and fever.   Respiratory: Negative for cough, chest tightness and shortness of breath.    Cardiovascular: Negative for chest pain, palpitations and leg swelling.   Endocrine: Negative for cold intolerance and heat intolerance.   Musculoskeletal: Positive for back pain.   Skin: Negative for rash.       Objective:      Physical Exam   Constitutional: He appears well-developed and well-nourished.   HENT:   Head: Normocephalic and atraumatic.   Cardiovascular: Normal rate, regular rhythm and normal heart sounds.    Pulmonary/Chest: Effort normal and breath sounds normal.   Psychiatric: He has a normal mood and affect.   Nursing note and vitals reviewed.      Assessment:       1. Essential hypertension    2. Dyslipidemia    3. Need for hepatitis C screening test    4. Screening for prostate cancer        Plan:       Essential hypertension  -     CBC auto differential; Future; Expected date: 06/19/2017  -     Comprehensive metabolic panel; Future; Expected date: 06/19/2017  -     Lipid panel; Future; Expected date: 06/19/2017  -     TSH; Future; Expected date: 06/19/2017    Dyslipidemia  -     CBC auto differential; Future; Expected date: 06/19/2017  -     Comprehensive metabolic panel; Future; Expected date: 06/19/2017  -     Lipid panel; Future; Expected date: 06/19/2017  -     TSH; Future; Expected date: 06/19/2017    Need for hepatitis C screening test  -     Hepatitis C antibody; Future; Expected date: 06/19/2017    Screening for prostate cancer  -     PSA, Screening; Future; Expected date: 06/19/2017    Other orders  -     gabapentin (NEURONTIN) 300 MG capsule; Take 1 capsule (300 mg total) by mouth nightly  as needed.  Dispense: 90 capsule; Refill: 1      Update labs and health maintenance.  Will monitor chronic medical issues and continue current plan of care.      Return in about 6 months (around 12/19/2017), or if symptoms worsen or fail to improve.

## 2017-06-26 ENCOUNTER — LAB VISIT (OUTPATIENT)
Dept: LAB | Facility: HOSPITAL | Age: 67
End: 2017-06-26
Attending: FAMILY MEDICINE
Payer: MEDICARE

## 2017-06-26 DIAGNOSIS — E78.5 DYSLIPIDEMIA: ICD-10-CM

## 2017-06-26 DIAGNOSIS — I10 ESSENTIAL HYPERTENSION: ICD-10-CM

## 2017-06-26 DIAGNOSIS — Z12.5 SCREENING FOR PROSTATE CANCER: ICD-10-CM

## 2017-06-26 DIAGNOSIS — Z11.59 NEED FOR HEPATITIS C SCREENING TEST: ICD-10-CM

## 2017-06-26 LAB
ALBUMIN SERPL BCP-MCNC: 3.8 G/DL
ALP SERPL-CCNC: 86 U/L
ALT SERPL W/O P-5'-P-CCNC: 19 U/L
ANION GAP SERPL CALC-SCNC: 9 MMOL/L
AST SERPL-CCNC: 21 U/L
BASOPHILS # BLD AUTO: 0.04 K/UL
BASOPHILS NFR BLD: 0.5 %
BILIRUB SERPL-MCNC: 0.4 MG/DL
BUN SERPL-MCNC: 16 MG/DL
CALCIUM SERPL-MCNC: 9.8 MG/DL
CHLORIDE SERPL-SCNC: 103 MMOL/L
CHOLEST/HDLC SERPL: 4.1 {RATIO}
CO2 SERPL-SCNC: 25 MMOL/L
COMPLEXED PSA SERPL-MCNC: 0.36 NG/ML
CREAT SERPL-MCNC: 1 MG/DL
DIFFERENTIAL METHOD: NORMAL
EOSINOPHIL # BLD AUTO: 0.2 K/UL
EOSINOPHIL NFR BLD: 2.3 %
ERYTHROCYTE [DISTWIDTH] IN BLOOD BY AUTOMATED COUNT: 13.4 %
EST. GFR  (AFRICAN AMERICAN): >60 ML/MIN/1.73 M^2
EST. GFR  (NON AFRICAN AMERICAN): >60 ML/MIN/1.73 M^2
GLUCOSE SERPL-MCNC: 103 MG/DL
HCT VFR BLD AUTO: 43.2 %
HDL/CHOLESTEROL RATIO: 24.3 %
HDLC SERPL-MCNC: 144 MG/DL
HDLC SERPL-MCNC: 35 MG/DL
HGB BLD-MCNC: 14.5 G/DL
LDLC SERPL CALC-MCNC: 66.2 MG/DL
LYMPHOCYTES # BLD AUTO: 2.5 K/UL
LYMPHOCYTES NFR BLD: 28.4 %
MCH RBC QN AUTO: 29 PG
MCHC RBC AUTO-ENTMCNC: 33.6 %
MCV RBC AUTO: 86 FL
MONOCYTES # BLD AUTO: 0.8 K/UL
MONOCYTES NFR BLD: 9.2 %
NEUTROPHILS # BLD AUTO: 5.2 K/UL
NEUTROPHILS NFR BLD: 59.4 %
NONHDLC SERPL-MCNC: 109 MG/DL
PLATELET # BLD AUTO: 275 K/UL
PMV BLD AUTO: 10.5 FL
POTASSIUM SERPL-SCNC: 4.3 MMOL/L
PROT SERPL-MCNC: 7.5 G/DL
RBC # BLD AUTO: 5 M/UL
SODIUM SERPL-SCNC: 137 MMOL/L
TRIGL SERPL-MCNC: 214 MG/DL
TSH SERPL DL<=0.005 MIU/L-ACNC: 0.68 UIU/ML
WBC # BLD AUTO: 8.73 K/UL

## 2017-06-26 PROCEDURE — 80053 COMPREHEN METABOLIC PANEL: CPT

## 2017-06-26 PROCEDURE — 86803 HEPATITIS C AB TEST: CPT

## 2017-06-26 PROCEDURE — 85025 COMPLETE CBC W/AUTO DIFF WBC: CPT

## 2017-06-26 PROCEDURE — 36415 COLL VENOUS BLD VENIPUNCTURE: CPT | Mod: PO

## 2017-06-26 PROCEDURE — 84443 ASSAY THYROID STIM HORMONE: CPT

## 2017-06-26 PROCEDURE — 84153 ASSAY OF PSA TOTAL: CPT

## 2017-06-26 PROCEDURE — 80061 LIPID PANEL: CPT

## 2017-06-27 LAB — HCV AB SERPL QL IA: NEGATIVE

## 2017-07-11 DIAGNOSIS — I25.10 ATHEROSCLEROSIS OF NATIVE CORONARY ARTERY WITHOUT ANGINA PECTORIS, UNSPECIFIED WHETHER NATIVE OR TRANSPLANTED HEART: ICD-10-CM

## 2017-07-11 RX ORDER — PRAVASTATIN SODIUM 40 MG/1
TABLET ORAL
Qty: 30 TABLET | Refills: 0 | Status: SHIPPED | OUTPATIENT
Start: 2017-07-11 | End: 2017-12-13 | Stop reason: SDUPTHER

## 2017-07-14 RX ORDER — METOPROLOL SUCCINATE 25 MG/1
TABLET, EXTENDED RELEASE ORAL
Qty: 30 TABLET | Refills: 11 | Status: SHIPPED | OUTPATIENT
Start: 2017-07-14 | End: 2017-12-13 | Stop reason: SDUPTHER

## 2017-08-26 DIAGNOSIS — I25.10 ATHEROSCLEROSIS OF NATIVE CORONARY ARTERY WITHOUT ANGINA PECTORIS: ICD-10-CM

## 2017-08-28 RX ORDER — PRAVASTATIN SODIUM 40 MG/1
TABLET ORAL
Qty: 30 TABLET | Refills: 6 | Status: SHIPPED | OUTPATIENT
Start: 2017-08-28 | End: 2017-11-06 | Stop reason: SDUPTHER

## 2017-11-06 ENCOUNTER — OFFICE VISIT (OUTPATIENT)
Dept: CARDIOLOGY | Facility: CLINIC | Age: 67
End: 2017-11-06
Payer: MEDICARE

## 2017-11-06 VITALS
SYSTOLIC BLOOD PRESSURE: 137 MMHG | WEIGHT: 285.69 LBS | HEIGHT: 72 IN | DIASTOLIC BLOOD PRESSURE: 88 MMHG | HEART RATE: 74 BPM | BODY MASS INDEX: 38.7 KG/M2

## 2017-11-06 DIAGNOSIS — I25.10 CAD IN NATIVE ARTERY: ICD-10-CM

## 2017-11-06 DIAGNOSIS — I10 ESSENTIAL HYPERTENSION: ICD-10-CM

## 2017-11-06 DIAGNOSIS — E78.5 DYSLIPIDEMIA: ICD-10-CM

## 2017-11-06 DIAGNOSIS — I25.5 CARDIOMYOPATHY, ISCHEMIC: ICD-10-CM

## 2017-11-06 PROCEDURE — 99999 PR PBB SHADOW E&M-EST. PATIENT-LVL III: CPT | Mod: PBBFAC,,, | Performed by: INTERNAL MEDICINE

## 2017-11-06 PROCEDURE — 99214 OFFICE O/P EST MOD 30 MIN: CPT | Mod: S$PBB,,, | Performed by: INTERNAL MEDICINE

## 2017-11-06 PROCEDURE — 99213 OFFICE O/P EST LOW 20 MIN: CPT | Mod: PBBFAC,PO | Performed by: INTERNAL MEDICINE

## 2017-11-06 NOTE — PROGRESS NOTES
Subjective:    Patient ID:  Pj Malone is a 66 y.o. male who presents for follow-up of Coronary Artery Disease (6 mo f/u); Hyperlipidemia; and Hypertension      Pt here for f/u, He reports no new cardiac symptoms or issues. He walks regularly w/o problem. His biggest problem he says is he likes to eat.         Review of Systems   Constitution: Negative for weight gain and weight loss.   HENT: Negative.    Eyes: Negative.    Cardiovascular: Negative for chest pain, claudication, cyanosis, dyspnea on exertion, irregular heartbeat, leg swelling, near-syncope, orthopnea (no PND), palpitations and syncope.   Respiratory: Negative for cough, hemoptysis, shortness of breath and snoring.    Endocrine: Negative.    Skin: Negative.    Musculoskeletal: Negative for joint pain, muscle cramps, muscle weakness and myalgias.   Gastrointestinal: Negative for diarrhea, hematemesis, nausea and vomiting.   Genitourinary: Negative.    Neurological: Negative for dizziness, focal weakness, light-headedness, loss of balance, numbness, paresthesias and seizures.   Psychiatric/Behavioral: Negative.         Objective:    Physical Exam   Constitutional: He is oriented to person, place, and time. He appears well-developed and well-nourished.   HENT:   Mouth/Throat: Oropharynx is clear and moist.   Eyes: Pupils are equal, round, and reactive to light.   Neck: Normal range of motion. No thyromegaly present.   Cardiovascular: Normal rate, regular rhythm, S1 normal, S2 normal, normal heart sounds, intact distal pulses and normal pulses.   No extrasystoles are present. PMI is not displaced.  Exam reveals no friction rub.    No murmur heard.  Pulmonary/Chest: Effort normal and breath sounds normal. He has no wheezes. He has no rales. He exhibits no tenderness.   Abdominal: Soft. Bowel sounds are normal. He exhibits no distension and no mass. There is no tenderness.   Musculoskeletal: Normal range of motion. He exhibits no edema.   Neurological:  He is alert and oriented to person, place, and time.   Skin: Skin is warm and dry.   Vitals reviewed.      Test(s) Reviewed  I have reviewed the following in detail:  [] Stress test   [] Angiography   [] Echocardiogram   [x] Labs   [] Other:         Assessment:       1. CAD in native artery - occluded RCA with collaterals; mild non-obstructive Dz in LAD, LCX - cath 2007    2. Cardiomyopathy, ischemic - EF 35-40% ---> 55% echo 03/2013    3. Essential hypertension    4. Dyslipidemia         Plan:       Doing well  Cardiac status stable   We discussed increased activity and decreased calories  Continue present Rx  F/u 6 months

## 2017-11-18 DIAGNOSIS — I10 ESSENTIAL HYPERTENSION: ICD-10-CM

## 2017-11-22 RX ORDER — LISINOPRIL 20 MG/1
TABLET ORAL
Qty: 30 TABLET | Refills: 0 | Status: SHIPPED | OUTPATIENT
Start: 2017-11-22 | End: 2017-12-13 | Stop reason: SDUPTHER

## 2017-12-13 ENCOUNTER — OFFICE VISIT (OUTPATIENT)
Dept: FAMILY MEDICINE | Facility: CLINIC | Age: 67
End: 2017-12-13
Payer: MEDICARE

## 2017-12-13 ENCOUNTER — LAB VISIT (OUTPATIENT)
Dept: LAB | Facility: HOSPITAL | Age: 67
End: 2017-12-13
Attending: FAMILY MEDICINE
Payer: MEDICARE

## 2017-12-13 VITALS
HEIGHT: 72 IN | SYSTOLIC BLOOD PRESSURE: 118 MMHG | BODY MASS INDEX: 38.52 KG/M2 | DIASTOLIC BLOOD PRESSURE: 78 MMHG | HEART RATE: 88 BPM | WEIGHT: 284.38 LBS | RESPIRATION RATE: 18 BRPM

## 2017-12-13 DIAGNOSIS — R35.1 NOCTURIA: ICD-10-CM

## 2017-12-13 DIAGNOSIS — J01.00 ACUTE MAXILLARY SINUSITIS, RECURRENCE NOT SPECIFIED: ICD-10-CM

## 2017-12-13 DIAGNOSIS — I10 ESSENTIAL HYPERTENSION: ICD-10-CM

## 2017-12-13 DIAGNOSIS — I10 ESSENTIAL HYPERTENSION: Primary | ICD-10-CM

## 2017-12-13 DIAGNOSIS — E78.5 DYSLIPIDEMIA: ICD-10-CM

## 2017-12-13 DIAGNOSIS — I25.10 ATHEROSCLEROSIS OF NATIVE CORONARY ARTERY WITHOUT ANGINA PECTORIS, UNSPECIFIED WHETHER NATIVE OR TRANSPLANTED HEART: ICD-10-CM

## 2017-12-13 LAB
ALBUMIN SERPL BCP-MCNC: 3.9 G/DL
ALP SERPL-CCNC: 90 U/L
ALT SERPL W/O P-5'-P-CCNC: 34 U/L
ANION GAP SERPL CALC-SCNC: 9 MMOL/L
AST SERPL-CCNC: 34 U/L
BILIRUB SERPL-MCNC: 0.4 MG/DL
BUN SERPL-MCNC: 14 MG/DL
CALCIUM SERPL-MCNC: 9.7 MG/DL
CHLORIDE SERPL-SCNC: 102 MMOL/L
CHOLEST SERPL-MCNC: 121 MG/DL
CHOLEST/HDLC SERPL: 3.7 {RATIO}
CO2 SERPL-SCNC: 27 MMOL/L
CREAT SERPL-MCNC: 1 MG/DL
EST. GFR  (AFRICAN AMERICAN): >60 ML/MIN/1.73 M^2
EST. GFR  (NON AFRICAN AMERICAN): >60 ML/MIN/1.73 M^2
GLUCOSE SERPL-MCNC: 112 MG/DL
HDLC SERPL-MCNC: 33 MG/DL
HDLC SERPL: 27.3 %
LDLC SERPL CALC-MCNC: 53.8 MG/DL
NONHDLC SERPL-MCNC: 88 MG/DL
POTASSIUM SERPL-SCNC: 4.6 MMOL/L
PROT SERPL-MCNC: 7.7 G/DL
SODIUM SERPL-SCNC: 138 MMOL/L
TRIGL SERPL-MCNC: 171 MG/DL

## 2017-12-13 PROCEDURE — 80053 COMPREHEN METABOLIC PANEL: CPT

## 2017-12-13 PROCEDURE — 80061 LIPID PANEL: CPT

## 2017-12-13 PROCEDURE — 36415 COLL VENOUS BLD VENIPUNCTURE: CPT | Mod: PO

## 2017-12-13 PROCEDURE — 99999 PR PBB SHADOW E&M-EST. PATIENT-LVL III: CPT | Mod: PBBFAC,,, | Performed by: FAMILY MEDICINE

## 2017-12-13 PROCEDURE — 99213 OFFICE O/P EST LOW 20 MIN: CPT | Mod: PBBFAC,PO | Performed by: FAMILY MEDICINE

## 2017-12-13 PROCEDURE — 99214 OFFICE O/P EST MOD 30 MIN: CPT | Mod: S$PBB,,, | Performed by: FAMILY MEDICINE

## 2017-12-13 RX ORDER — GABAPENTIN 300 MG/1
300 CAPSULE ORAL NIGHTLY PRN
Qty: 90 CAPSULE | Refills: 1 | Status: SHIPPED | OUTPATIENT
Start: 2017-12-13 | End: 2019-03-11 | Stop reason: SDUPTHER

## 2017-12-13 RX ORDER — AMOXICILLIN 875 MG/1
875 TABLET, FILM COATED ORAL 2 TIMES DAILY
Qty: 14 TABLET | Refills: 0 | Status: SHIPPED | OUTPATIENT
Start: 2017-12-13 | End: 2017-12-20

## 2017-12-13 RX ORDER — PRAVASTATIN SODIUM 40 MG/1
40 TABLET ORAL DAILY
Qty: 90 TABLET | Refills: 1 | Status: SHIPPED | OUTPATIENT
Start: 2017-12-13 | End: 2018-07-20 | Stop reason: SDUPTHER

## 2017-12-13 RX ORDER — METOPROLOL SUCCINATE 25 MG/1
TABLET, EXTENDED RELEASE ORAL
Qty: 90 TABLET | Refills: 1 | Status: SHIPPED | OUTPATIENT
Start: 2017-12-13 | End: 2018-07-20 | Stop reason: SDUPTHER

## 2017-12-13 RX ORDER — LISINOPRIL 20 MG/1
TABLET ORAL
Qty: 90 TABLET | Refills: 1 | Status: SHIPPED | OUTPATIENT
Start: 2017-12-13 | End: 2018-07-20 | Stop reason: SDUPTHER

## 2017-12-13 NOTE — PROGRESS NOTES
Subjective:       Patient ID: Pj Malone is a 67 y.o. male.    Chief Complaint: Hypertension (Patient here for 6 monoth follow up) and Sinus Problem (Patient reports that he has sinus drainage with foul taste. )    Here for f/u chronic medical issues. States doing well overall.  Sinus issues for last several months with bad taste from drainage.      Hypertension   This is a chronic problem. The current episode started more than 1 year ago. The problem is controlled. Pertinent negatives include no chest pain, palpitations or shortness of breath.   Hyperlipidemia   This is a chronic problem. The current episode started more than 1 year ago. The problem is controlled. Recent lipid tests were reviewed and are normal. Pertinent negatives include no chest pain or shortness of breath. Current antihyperlipidemic treatment includes statins.     Review of Systems   Constitutional: Negative for chills, fatigue and fever.   HENT: Positive for congestion.    Respiratory: Negative for cough, chest tightness and shortness of breath.    Cardiovascular: Negative for chest pain, palpitations and leg swelling.   Gastrointestinal: Negative for abdominal distention and abdominal pain.   Endocrine: Negative for cold intolerance and heat intolerance.   Skin: Negative for rash.   Psychiatric/Behavioral: Negative for dysphoric mood. The patient is not nervous/anxious.        Objective:      Physical Exam   Constitutional: He appears well-developed and well-nourished.   HENT:   Head: Normocephalic and atraumatic.   Nose: Mucosal edema present.   Mouth/Throat: Oropharynx is clear and moist.   Cardiovascular: Normal rate, regular rhythm and normal heart sounds.    Pulmonary/Chest: Effort normal and breath sounds normal.   Abdominal: Soft. Bowel sounds are normal.   Psychiatric: He has a normal mood and affect.   Nursing note and vitals reviewed.      Assessment:       1. Essential hypertension    2. Atherosclerosis of native coronary artery  without angina pectoris, unspecified whether native or transplanted heart    3. Nocturia    4. Dyslipidemia    5. Acute maxillary sinusitis, recurrence not specified        Plan:       Essential hypertension  -     lisinopril (PRINIVIL,ZESTRIL) 20 MG tablet; TAKE 1 TABLET(20 MG) BY MOUTH EVERY DAY  Dispense: 90 tablet; Refill: 1  -     Comprehensive metabolic panel; Future; Expected date: 12/13/2017  -     Lipid panel; Future; Expected date: 12/13/2017    Atherosclerosis of native coronary artery without angina pectoris, unspecified whether native or transplanted heart  -     pravastatin (PRAVACHOL) 40 MG tablet; Take 1 tablet (40 mg total) by mouth once daily.  Dispense: 90 tablet; Refill: 1    Nocturia  -     Ambulatory referral to Urology    Dyslipidemia  -     Comprehensive metabolic panel; Future; Expected date: 12/13/2017  -     Lipid panel; Future; Expected date: 12/13/2017    Acute maxillary sinusitis, recurrence not specified    Other orders  -     metoprolol succinate (TOPROL-XL) 25 MG 24 hr tablet; TAKE 1 TABLET(25 MG) BY MOUTH EVERY DAY  Dispense: 90 tablet; Refill: 1  -     gabapentin (NEURONTIN) 300 MG capsule; Take 1 capsule (300 mg total) by mouth nightly as needed.  Dispense: 90 capsule; Refill: 1  -     amoxicillin (AMOXIL) 875 MG tablet; Take 1 tablet (875 mg total) by mouth 2 (two) times daily.  Dispense: 14 tablet; Refill: 0      Update labs and health maintenance  Will monitor chronic medical issues and continue current plan of care.      Return in about 6 months (around 6/13/2018), or if symptoms worsen or fail to improve.

## 2018-01-23 ENCOUNTER — OFFICE VISIT (OUTPATIENT)
Dept: UROLOGY | Facility: CLINIC | Age: 68
End: 2018-01-23
Payer: MEDICARE

## 2018-01-23 VITALS
BODY MASS INDEX: 39.59 KG/M2 | DIASTOLIC BLOOD PRESSURE: 80 MMHG | WEIGHT: 292.31 LBS | HEART RATE: 76 BPM | SYSTOLIC BLOOD PRESSURE: 130 MMHG | HEIGHT: 72 IN

## 2018-01-23 DIAGNOSIS — N40.1 BENIGN PROSTATIC HYPERPLASIA WITH URINARY OBSTRUCTION: ICD-10-CM

## 2018-01-23 DIAGNOSIS — N13.8 BENIGN PROSTATIC HYPERPLASIA WITH URINARY OBSTRUCTION: ICD-10-CM

## 2018-01-23 DIAGNOSIS — R35.1 NOCTURIA: Primary | ICD-10-CM

## 2018-01-23 DIAGNOSIS — R33.9 INCOMPLETE BLADDER EMPTYING: ICD-10-CM

## 2018-01-23 LAB
BILIRUB SERPL-MCNC: NORMAL MG/DL
BLOOD URINE, POC: NORMAL
COLOR, POC UA: YELLOW
GLUCOSE UR QL STRIP: NORMAL
KETONES UR QL STRIP: NORMAL
LEUKOCYTE ESTERASE URINE, POC: NORMAL
NITRITE, POC UA: NORMAL
PH, POC UA: 7
PROTEIN, POC: NORMAL
SPECIFIC GRAVITY, POC UA: 1.02
UROBILINOGEN, POC UA: 1

## 2018-01-23 PROCEDURE — 99204 OFFICE O/P NEW MOD 45 MIN: CPT | Mod: S$PBB,,, | Performed by: UROLOGY

## 2018-01-23 PROCEDURE — 51798 US URINE CAPACITY MEASURE: CPT | Mod: PBBFAC,PO | Performed by: UROLOGY

## 2018-01-23 PROCEDURE — 81002 URINALYSIS NONAUTO W/O SCOPE: CPT | Mod: PBBFAC,PO | Performed by: UROLOGY

## 2018-01-23 PROCEDURE — 99999 PR PBB SHADOW E&M-EST. PATIENT-LVL III: CPT | Mod: PBBFAC,,, | Performed by: UROLOGY

## 2018-01-23 PROCEDURE — 99213 OFFICE O/P EST LOW 20 MIN: CPT | Mod: PBBFAC,PO | Performed by: UROLOGY

## 2018-01-23 NOTE — PROGRESS NOTES
UROLOGY [facility]  1 23 18    c-c nocturia    Age 67, comes in concern that he has nocturia x 3, it negatively affects his sleep. During the daytime he does not have abnormal urinary frequency. No urgency or incontinence. No burning or pains. No hematuria.     Says that if he limits the drinking of fluids in the evening his nocturia is reduced to x1-2.    BLADDERSCAN ULTRASOUND   14 ml residual        PMH    Surgical:  has a past surgical history that includes Joint replacement (Left, 2007); Cardiac catheterization; Tonsillectomy; and Colonoscopy (N/A, 5/25/2017).    Medical:  has a past medical history of Coronary artery disease; Former smoker; Hyperlipidemia; and Hypertension.    Familial: father had neck cancer, mother had heart disease; no fh of prostate ca    Social: retired , , lives in [place]    Current Outpatient Prescriptions on File Prior to Visit   Medication Sig Dispense Refill    aspirin (ECOTRIN) 81 MG EC tablet Take 81 mg by mouth once daily.      gabapentin (NEURONTIN) 300 MG capsule Take 1 capsule (300 mg total) by  90 capsule 1    lisinopril (PRINIVIL,ZESTRIL) 20 MG tablet TAKE 1 TABLET(20 MG)  90 tablet 1    metoprolol succinate (TOPROL-XL) 25 MG 24 hr tablet TAKE 1 TABLET(25 MG) BY MOUTH EVERY DAY 90 tablet 1    pravastatin (PRAVACHOL) 40 MG tablet Take 1 tablet (40 mg total) by m 90 tablet 1       REVIEW OF SYSTEMS  GENERAL:some complaints of fatigue. No headaches or dizzy spells.   HEENT: vision preserved. Sinuses: some complaints.   CARDIOPULMONARY: No swelling of the legs; no chest pain. No shortness of breath, no wheezing.   GASTROINTESTINAL: No heartburn. Denies diarrhea; denies constipation, no blood or mucus in stools.   GENITOURINARY: has nocturia. Denies dysuria, bleeding or incontinence.   MUSCULOSKELETAL: some arthritic complaints such as pain or stiffness, especially the back.   PSYCHIATRIC: No history of depression or anxiety.   ENDOCRINOLOGIC: No  reports of sweating, cold or heat intolerance. No polyuria or polydipsia.   NEUROLOGICAL: No headache, dizziness, syncope, paralysis  LYMPHATICS: No enlarged nodes. No history of splenectomy.  ==      Pt alert, oriented, no distress  HEENT: wnl.  Neck: supple, no JVD, no lymphadenopathy  Chest: CV NSR  Lungs: normal chest expansion  Abdomen prominent, nontender, no organomegaly, no masses.  No hernias  Penis nl, meatus nl  Testes nl, epi nl, scrotum nl  TATA: anus nl, sphincter nl tone, mucosa without lesions, prostate 30 gm, symmetric, no nodules or indurations  Extremities: no edema, peripheral pulses nl  Neuro: preserved    Creatinine 1 one month ago;   psa 0.36 six months ago    IMP  Nocturia  bph     I am encouraging pt to curtail the consumption of fluids in the evening.   Later on we can consider starting him on flomax

## 2018-01-23 NOTE — LETTER
January 23, 2018      NAVA López MD  [address] [facility] [address] [place] [address]           [facility]  [address] [facility] [address] [place] [address]  Phone: [phone]          Patient: Pj Malone   MR Number: [id number]   YOB: 1950   Date of Visit: 1/23/2018       Dear Dr. NAVA López:    Thank you for referring Pj Malone to me for evaluation. Attached you will find relevant portions of my assessment and plan of care.    If you have questions, please do not hesitate to call me. I look forward to following Pj Malone along with you.    Sincerely,    Srinath Ambriz MD    Enclosure  CC:  No Recipients    If you would like to receive this communication electronically, please contact [email or web address] or [phone] to request more information on [employer] access.    For providers and/or their staff who would like to refer a patient to [facility], please contact us through our one-stop-shop provider referral line, [facility] , at [phone].    If you feel you have received this communication in error or would no longer like to receive these types of communications, please e-mail [email or web address]

## 2018-07-20 DIAGNOSIS — I10 ESSENTIAL HYPERTENSION: ICD-10-CM

## 2018-07-20 DIAGNOSIS — I25.10 ATHEROSCLEROSIS OF NATIVE CORONARY ARTERY WITHOUT ANGINA PECTORIS, UNSPECIFIED WHETHER NATIVE OR TRANSPLANTED HEART: ICD-10-CM

## 2018-07-20 RX ORDER — LISINOPRIL 20 MG/1
TABLET ORAL
Qty: 90 TABLET | Refills: 1 | Status: SHIPPED | OUTPATIENT
Start: 2018-07-20 | End: 2019-03-11 | Stop reason: SDUPTHER

## 2018-07-20 RX ORDER — PRAVASTATIN SODIUM 40 MG/1
TABLET ORAL
Qty: 90 TABLET | Refills: 1 | Status: SHIPPED | OUTPATIENT
Start: 2018-07-20 | End: 2019-03-11 | Stop reason: SDUPTHER

## 2018-07-20 RX ORDER — METOPROLOL SUCCINATE 25 MG/1
TABLET, EXTENDED RELEASE ORAL
Qty: 90 TABLET | Refills: 1 | Status: SHIPPED | OUTPATIENT
Start: 2018-07-20 | End: 2019-03-11 | Stop reason: SDUPTHER

## 2018-07-20 NOTE — PROGRESS NOTES
Refill Authorization Note     is requesting a refill authorization.    Brief assessment and rationale for refill: APPROVE: prr  Amount/Quantity of medication ordered: 90d  Date of last appointment: 12/13/2017     Refills Authorized: Yes              Medication Therapy Plan: Labs-current; HTN & HLD- commented as controlled at lov; Approve 6 more months   Name and strength of medication: metoprolol succinate (TOPROL-XL) 25 MG 24 hr tablet/  pravastatin (PRAVACHOL) 40 MG tablet/ lisinopril (PRINIVIL,ZESTRIL) 20 MG tablet  How patient will take medication: UTD  Medication reconciliation completed: No  Comments:     BP Readings from Last 3 Encounters:   01/23/18 130/80   12/13/17 118/78   11/06/17 137/88     Pulse Readings from Last 3 Encounters:   01/23/18 76   12/13/17 88   11/06/17 74     Lab Results   Component Value Date    CREATININE 1.0 12/13/2017    BUN 14 12/13/2017     12/13/2017    K 4.6 12/13/2017     12/13/2017    CO2 27 12/13/2017     Lab Results   Component Value Date    CHOL 121 12/13/2017    CHOL 144 06/26/2017    CHOL 188 10/31/2016     Lab Results   Component Value Date    HDL 33 (L) 12/13/2017    HDL 35 (L) 06/26/2017    HDL 37 (L) 10/31/2016     Lab Results   Component Value Date    LDLCALC 53.8 (L) 12/13/2017    LDLCALC 66.2 06/26/2017    LDLCALC 90.2 10/31/2016     Lab Results   Component Value Date    TRIG 171 (H) 12/13/2017    TRIG 214 (H) 06/26/2017    TRIG 304 (H) 10/31/2016     Lab Results   Component Value Date    CHOLHDL 27.3 12/13/2017    CHOLHDL 24.3 06/26/2017    CHOLHDL 19.7 (L) 10/31/2016

## 2019-02-05 ENCOUNTER — OFFICE VISIT (OUTPATIENT)
Dept: CARDIOLOGY | Facility: CLINIC | Age: 69
End: 2019-02-05
Payer: MEDICARE

## 2019-02-05 VITALS
SYSTOLIC BLOOD PRESSURE: 116 MMHG | HEIGHT: 72 IN | BODY MASS INDEX: 38.7 KG/M2 | DIASTOLIC BLOOD PRESSURE: 77 MMHG | WEIGHT: 285.69 LBS | HEART RATE: 78 BPM

## 2019-02-05 DIAGNOSIS — E66.9 CLASS 2 OBESITY WITH BODY MASS INDEX (BMI) OF 38.0 TO 38.9 IN ADULT, UNSPECIFIED OBESITY TYPE, UNSPECIFIED WHETHER SERIOUS COMORBIDITY PRESENT: ICD-10-CM

## 2019-02-05 DIAGNOSIS — I25.5 CARDIOMYOPATHY, ISCHEMIC: ICD-10-CM

## 2019-02-05 DIAGNOSIS — E78.5 DYSLIPIDEMIA: ICD-10-CM

## 2019-02-05 DIAGNOSIS — I25.10 CAD IN NATIVE ARTERY: Primary | ICD-10-CM

## 2019-02-05 DIAGNOSIS — I10 ESSENTIAL HYPERTENSION: ICD-10-CM

## 2019-02-05 PROCEDURE — 99213 OFFICE O/P EST LOW 20 MIN: CPT | Mod: PBBFAC,PO | Performed by: INTERNAL MEDICINE

## 2019-02-05 PROCEDURE — 99999 PR PBB SHADOW E&M-EST. PATIENT-LVL III: CPT | Mod: PBBFAC,,, | Performed by: INTERNAL MEDICINE

## 2019-02-05 PROCEDURE — 99214 PR OFFICE/OUTPT VISIT, EST, LEVL IV, 30-39 MIN: ICD-10-PCS | Mod: S$PBB,,, | Performed by: INTERNAL MEDICINE

## 2019-02-05 PROCEDURE — 99999 PR PBB SHADOW E&M-EST. PATIENT-LVL III: ICD-10-PCS | Mod: PBBFAC,,, | Performed by: INTERNAL MEDICINE

## 2019-02-05 PROCEDURE — 99214 OFFICE O/P EST MOD 30 MIN: CPT | Mod: S$PBB,,, | Performed by: INTERNAL MEDICINE

## 2019-02-05 NOTE — PROGRESS NOTES
Subjective:    Patient ID:  Pj Malone is a 68 y.o. male who presents for follow-up of Coronary Artery Disease (follow up); Hypertension; and Hyperlipidemia      Pt here for yearly f/u. Since last visit he reports no new cardiac issues. He denies any palpitations, dizziness, syncope or pre-syncope. He denies any chest pain, SOB, PND, orthopnea. He denies claudication, lower extremity edema. He denies exertional symptoms. He has been experiencing some ED issues.         Review of Systems   Constitution: Negative for weight gain and weight loss.   HENT: Negative.    Eyes: Negative.    Cardiovascular: Negative for chest pain, claudication, cyanosis, dyspnea on exertion, irregular heartbeat, leg swelling, near-syncope, orthopnea (no PND), palpitations and syncope.   Respiratory: Negative for cough, hemoptysis, shortness of breath and snoring.    Endocrine: Negative.    Skin: Negative.    Musculoskeletal: Negative for joint pain, muscle cramps, muscle weakness and myalgias.   Gastrointestinal: Negative for diarrhea, hematemesis, nausea and vomiting.   Genitourinary: Negative.    Neurological: Negative for dizziness, focal weakness, light-headedness, loss of balance, numbness, paresthesias and seizures.   Psychiatric/Behavioral: Negative.         Objective:    Physical Exam   Constitutional: He is oriented to person, place, and time. He appears well-developed and well-nourished.   HENT:   Mouth/Throat: Oropharynx is clear and moist.   Eyes: Pupils are equal, round, and reactive to light.   Neck: Normal range of motion. No thyromegaly present.   Cardiovascular: Normal rate, regular rhythm, S1 normal, S2 normal, normal heart sounds, intact distal pulses and normal pulses.  No extrasystoles are present. PMI is not displaced. Exam reveals no friction rub.   No murmur heard.  Pulmonary/Chest: Effort normal and breath sounds normal. He has no wheezes. He has no rales. He exhibits no tenderness.   Abdominal: Soft. Bowel  sounds are normal. He exhibits no distension and no mass. There is no tenderness.   Musculoskeletal: Normal range of motion. He exhibits no edema.   Neurological: He is alert and oriented to person, place, and time.   Skin: Skin is warm and dry.   Vitals reviewed.        Assessment:       1. CAD in native artery - occluded RCA with collaterals; mild non-obstructive Dz in LAD, LCX - cath 2007    2. Cardiomyopathy, ischemic - EF 35-40% ---> 55% echo 03/2013    3. Dyslipidemia    4. Essential hypertension    5. Class 2 obesity with body mass index (BMI) of 38.0 to 38.9 in adult, unspecified obesity type, unspecified whether serious comorbidity present         Plan:       Cardiac status stable  We discussed increased activity and exercise to help with weight loss  He is over due for PCP visit  He may want to see urologist about ED - no cardiac contraindication to viagra or other ED med  Will get Echo as it's been nearly 6 years since last one   Yes

## 2019-02-28 ENCOUNTER — EXTERNAL CHRONIC CARE MANAGEMENT (OUTPATIENT)
Dept: PRIMARY CARE CLINIC | Facility: CLINIC | Age: 69
End: 2019-02-28
Payer: MEDICARE

## 2019-02-28 PROCEDURE — 99487 CPLX CHRNC CARE 1ST 60 MIN: CPT | Mod: S$PBB,,, | Performed by: FAMILY MEDICINE

## 2019-02-28 PROCEDURE — 99487 CPLX CHRNC CARE 1ST 60 MIN: CPT | Mod: PBBFAC,PO | Performed by: FAMILY MEDICINE

## 2019-02-28 PROCEDURE — 99487 PR COMPLX CHRON CARE MGMT, 1ST HR, PER MONTH: ICD-10-PCS | Mod: S$PBB,,, | Performed by: FAMILY MEDICINE

## 2019-03-11 DIAGNOSIS — I25.10 ATHEROSCLEROSIS OF NATIVE CORONARY ARTERY WITHOUT ANGINA PECTORIS, UNSPECIFIED WHETHER NATIVE OR TRANSPLANTED HEART: ICD-10-CM

## 2019-03-11 DIAGNOSIS — I10 ESSENTIAL HYPERTENSION: ICD-10-CM

## 2019-03-12 RX ORDER — GABAPENTIN 300 MG/1
CAPSULE ORAL
Qty: 90 CAPSULE | Refills: 0 | Status: SHIPPED | OUTPATIENT
Start: 2019-03-12 | End: 2019-04-08 | Stop reason: SDUPTHER

## 2019-03-12 RX ORDER — METOPROLOL SUCCINATE 25 MG/1
TABLET, EXTENDED RELEASE ORAL
Qty: 90 TABLET | Refills: 0 | Status: SHIPPED | OUTPATIENT
Start: 2019-03-12 | End: 2019-10-08 | Stop reason: SDUPTHER

## 2019-03-12 RX ORDER — LISINOPRIL 20 MG/1
TABLET ORAL
Qty: 90 TABLET | Refills: 0 | Status: SHIPPED | OUTPATIENT
Start: 2019-03-12 | End: 2019-04-08 | Stop reason: SDUPTHER

## 2019-03-12 RX ORDER — LISINOPRIL 20 MG/1
TABLET ORAL
Qty: 90 TABLET | Refills: 0 | Status: SHIPPED | OUTPATIENT
Start: 2019-03-12 | End: 2019-06-25 | Stop reason: SDUPTHER

## 2019-03-12 RX ORDER — PRAVASTATIN SODIUM 40 MG/1
TABLET ORAL
Qty: 90 TABLET | Refills: 0 | Status: SHIPPED | OUTPATIENT
Start: 2019-03-12 | End: 2019-10-08 | Stop reason: SDUPTHER

## 2019-03-26 ENCOUNTER — PATIENT OUTREACH (OUTPATIENT)
Dept: ADMINISTRATIVE | Facility: HOSPITAL | Age: 69
End: 2019-03-26

## 2019-03-26 NOTE — LETTER
March 26, 2019    Pj Malone  [address]  [facility] [address]             [facility]  [address]  [facility] [address]  Phone: [phone] Dear Mr. Malone:    Your [facility] primary care team is dedicated to assisting you achieve your health goals.  [facility] now offers a Digital Medicine Program that could help you better manage your Hypertension.    As a participant in Digital Medicine, you will be able to send home BLOOD PRESSURE readings directly from your smartphone into your medical record at [facility]. ANTONIO López MD , along with a team of pharmacists and health coaches, will monitor this data, help you adjust your medication(s), keep you on track with your routine preventative testing, and/or make lifestyle recommendations, so you can meet your personal health goals.     Getting you the right support to coordinate your care and achieve your healthcare goals is important to us. Please consider taking advantage of this unique service to help you improve your health.  Please discuss at your upcoming appointment if you are interested in signing up.        Sincerely,  Shanique Wilson  Clinical Care Coordinator  [facility]

## 2019-03-31 ENCOUNTER — EXTERNAL CHRONIC CARE MANAGEMENT (OUTPATIENT)
Dept: PRIMARY CARE CLINIC | Facility: CLINIC | Age: 69
End: 2019-03-31
Payer: MEDICARE

## 2019-03-31 PROCEDURE — 99490 CHRNC CARE MGMT STAFF 1ST 20: CPT | Mod: PBBFAC,PO | Performed by: FAMILY MEDICINE

## 2019-03-31 PROCEDURE — 99490 PR CHRONIC CARE MGMT, 1ST 20 MIN: ICD-10-PCS | Mod: S$PBB,,, | Performed by: FAMILY MEDICINE

## 2019-03-31 PROCEDURE — 99490 CHRNC CARE MGMT STAFF 1ST 20: CPT | Mod: S$PBB,,, | Performed by: FAMILY MEDICINE

## 2019-04-06 ENCOUNTER — PATIENT OUTREACH (OUTPATIENT)
Dept: ADMINISTRATIVE | Facility: HOSPITAL | Age: 69
End: 2019-04-06

## 2019-04-08 ENCOUNTER — OFFICE VISIT (OUTPATIENT)
Dept: FAMILY MEDICINE | Facility: CLINIC | Age: 69
End: 2019-04-08
Payer: MEDICARE

## 2019-04-08 ENCOUNTER — LAB VISIT (OUTPATIENT)
Dept: LAB | Facility: HOSPITAL | Age: 69
End: 2019-04-08
Attending: FAMILY MEDICINE
Payer: MEDICARE

## 2019-04-08 ENCOUNTER — CLINICAL SUPPORT (OUTPATIENT)
Dept: CARDIOLOGY | Facility: CLINIC | Age: 69
End: 2019-04-08
Attending: INTERNAL MEDICINE
Payer: MEDICARE

## 2019-04-08 VITALS
SYSTOLIC BLOOD PRESSURE: 132 MMHG | WEIGHT: 285.94 LBS | DIASTOLIC BLOOD PRESSURE: 73 MMHG | HEART RATE: 65 BPM | HEART RATE: 74 BPM | OXYGEN SATURATION: 98 % | SYSTOLIC BLOOD PRESSURE: 124 MMHG | BODY MASS INDEX: 38.6 KG/M2 | BODY MASS INDEX: 38.73 KG/M2 | HEIGHT: 72 IN | WEIGHT: 285 LBS | HEIGHT: 72 IN | TEMPERATURE: 98 F | DIASTOLIC BLOOD PRESSURE: 72 MMHG

## 2019-04-08 DIAGNOSIS — I10 ESSENTIAL HYPERTENSION: ICD-10-CM

## 2019-04-08 DIAGNOSIS — I25.10 CAD IN NATIVE ARTERY: ICD-10-CM

## 2019-04-08 DIAGNOSIS — E78.5 DYSLIPIDEMIA: ICD-10-CM

## 2019-04-08 DIAGNOSIS — E66.9 CLASS 2 OBESITY WITH BODY MASS INDEX (BMI) OF 38.0 TO 38.9 IN ADULT, UNSPECIFIED OBESITY TYPE, UNSPECIFIED WHETHER SERIOUS COMORBIDITY PRESENT: ICD-10-CM

## 2019-04-08 DIAGNOSIS — Z13.6 SCREENING FOR AAA (ABDOMINAL AORTIC ANEURYSM): ICD-10-CM

## 2019-04-08 DIAGNOSIS — I25.5 CARDIOMYOPATHY, ISCHEMIC: ICD-10-CM

## 2019-04-08 DIAGNOSIS — Z12.5 SCREENING PSA (PROSTATE SPECIFIC ANTIGEN): ICD-10-CM

## 2019-04-08 DIAGNOSIS — I10 ESSENTIAL HYPERTENSION: Primary | ICD-10-CM

## 2019-04-08 LAB
ALBUMIN SERPL BCP-MCNC: 4.1 G/DL (ref 3.5–5.2)
ALP SERPL-CCNC: 84 U/L (ref 55–135)
ALT SERPL W/O P-5'-P-CCNC: 22 U/L (ref 10–44)
ANION GAP SERPL CALC-SCNC: 3 MMOL/L (ref 8–16)
AST SERPL-CCNC: 25 U/L (ref 10–40)
BASOPHILS # BLD AUTO: 0.08 K/UL (ref 0–0.2)
BASOPHILS NFR BLD: 1 % (ref 0–1.9)
BILIRUB SERPL-MCNC: 0.6 MG/DL (ref 0.1–1)
BUN SERPL-MCNC: 18 MG/DL (ref 8–23)
CALCIUM SERPL-MCNC: 10 MG/DL (ref 8.7–10.5)
CHLORIDE SERPL-SCNC: 103 MMOL/L (ref 95–110)
CHOLEST SERPL-MCNC: 165 MG/DL (ref 120–199)
CHOLEST/HDLC SERPL: 4 {RATIO} (ref 2–5)
CO2 SERPL-SCNC: 32 MMOL/L (ref 23–29)
COMPLEXED PSA SERPL-MCNC: 0.27 NG/ML (ref 0–4)
CREAT SERPL-MCNC: 1 MG/DL (ref 0.5–1.4)
DIFFERENTIAL METHOD: NORMAL
EOSINOPHIL # BLD AUTO: 0.2 K/UL (ref 0–0.5)
EOSINOPHIL NFR BLD: 2.4 % (ref 0–8)
ERYTHROCYTE [DISTWIDTH] IN BLOOD BY AUTOMATED COUNT: 13.2 % (ref 11.5–14.5)
EST. GFR  (AFRICAN AMERICAN): >60 ML/MIN/1.73 M^2
EST. GFR  (NON AFRICAN AMERICAN): >60 ML/MIN/1.73 M^2
GLUCOSE SERPL-MCNC: 105 MG/DL (ref 70–110)
HCT VFR BLD AUTO: 46.6 % (ref 40–54)
HDLC SERPL-MCNC: 41 MG/DL (ref 40–75)
HDLC SERPL: 24.8 % (ref 20–50)
HGB BLD-MCNC: 15.2 G/DL (ref 14–18)
IMM GRANULOCYTES # BLD AUTO: 0.01 K/UL (ref 0–0.04)
IMM GRANULOCYTES NFR BLD AUTO: 0.1 % (ref 0–0.5)
LDLC SERPL CALC-MCNC: 77.4 MG/DL (ref 63–159)
LYMPHOCYTES # BLD AUTO: 2.5 K/UL (ref 1–4.8)
LYMPHOCYTES NFR BLD: 31.3 % (ref 18–48)
MCH RBC QN AUTO: 28.8 PG (ref 27–31)
MCHC RBC AUTO-ENTMCNC: 32.6 G/DL (ref 32–36)
MCV RBC AUTO: 88 FL (ref 82–98)
MONOCYTES # BLD AUTO: 0.7 K/UL (ref 0.3–1)
MONOCYTES NFR BLD: 8.2 % (ref 4–15)
NEUTROPHILS # BLD AUTO: 4.5 K/UL (ref 1.8–7.7)
NEUTROPHILS NFR BLD: 57 % (ref 38–73)
NONHDLC SERPL-MCNC: 124 MG/DL
NRBC BLD-RTO: 0 /100 WBC
PLATELET # BLD AUTO: 244 K/UL (ref 150–350)
PMV BLD AUTO: 10.4 FL (ref 9.2–12.9)
POTASSIUM SERPL-SCNC: 5 MMOL/L (ref 3.5–5.1)
PROT SERPL-MCNC: 7.6 G/DL (ref 6–8.4)
RBC # BLD AUTO: 5.27 M/UL (ref 4.6–6.2)
SODIUM SERPL-SCNC: 138 MMOL/L (ref 136–145)
TRIGL SERPL-MCNC: 233 MG/DL (ref 30–150)
TSH SERPL DL<=0.005 MIU/L-ACNC: 0.74 UIU/ML (ref 0.4–4)
WBC # BLD AUTO: 7.96 K/UL (ref 3.9–12.7)

## 2019-04-08 PROCEDURE — 99999 PR PBB SHADOW E&M-EST. PATIENT-LVL II: ICD-10-PCS | Mod: PBBFAC,,,

## 2019-04-08 PROCEDURE — 93306 TRANSTHORACIC ECHO (TTE) COMPLETE (CUPID ONLY): ICD-10-PCS | Mod: 26,S$PBB,, | Performed by: INTERNAL MEDICINE

## 2019-04-08 PROCEDURE — 84153 ASSAY OF PSA TOTAL: CPT

## 2019-04-08 PROCEDURE — 99212 OFFICE O/P EST SF 10 MIN: CPT | Mod: PBBFAC,27,PO,25

## 2019-04-08 PROCEDURE — 80061 LIPID PANEL: CPT

## 2019-04-08 PROCEDURE — 99999 PR PBB SHADOW E&M-EST. PATIENT-LVL III: CPT | Mod: PBBFAC,,, | Performed by: FAMILY MEDICINE

## 2019-04-08 PROCEDURE — 36415 COLL VENOUS BLD VENIPUNCTURE: CPT | Mod: PO

## 2019-04-08 PROCEDURE — 99999 PR PBB SHADOW E&M-EST. PATIENT-LVL II: CPT | Mod: PBBFAC,,,

## 2019-04-08 PROCEDURE — 99214 PR OFFICE/OUTPT VISIT, EST, LEVL IV, 30-39 MIN: ICD-10-PCS | Mod: S$PBB,,, | Performed by: FAMILY MEDICINE

## 2019-04-08 PROCEDURE — 99214 OFFICE O/P EST MOD 30 MIN: CPT | Mod: S$PBB,,, | Performed by: FAMILY MEDICINE

## 2019-04-08 PROCEDURE — 84443 ASSAY THYROID STIM HORMONE: CPT

## 2019-04-08 PROCEDURE — 80053 COMPREHEN METABOLIC PANEL: CPT

## 2019-04-08 PROCEDURE — 85025 COMPLETE CBC W/AUTO DIFF WBC: CPT

## 2019-04-08 PROCEDURE — 99213 OFFICE O/P EST LOW 20 MIN: CPT | Mod: PBBFAC,PO,25 | Performed by: FAMILY MEDICINE

## 2019-04-08 PROCEDURE — 99999 PR PBB SHADOW E&M-EST. PATIENT-LVL III: ICD-10-PCS | Mod: PBBFAC,,, | Performed by: FAMILY MEDICINE

## 2019-04-08 PROCEDURE — 93306 TTE W/DOPPLER COMPLETE: CPT | Mod: PBBFAC,PO | Performed by: INTERNAL MEDICINE

## 2019-04-08 RX ORDER — SILDENAFIL 100 MG/1
100 TABLET, FILM COATED ORAL DAILY PRN
Qty: 5 TABLET | Refills: 2 | Status: SHIPPED | OUTPATIENT
Start: 2019-04-08 | End: 2019-10-08 | Stop reason: SDUPTHER

## 2019-04-08 RX ORDER — GABAPENTIN 600 MG/1
600 TABLET ORAL DAILY PRN
Qty: 90 TABLET | Refills: 0 | Status: SHIPPED | OUTPATIENT
Start: 2019-04-08 | End: 2019-10-08 | Stop reason: SDUPTHER

## 2019-04-08 RX ORDER — GABAPENTIN 300 MG/1
CAPSULE ORAL
Qty: 90 CAPSULE | Refills: 1 | Status: SHIPPED | OUTPATIENT
Start: 2019-04-08 | End: 2019-04-08

## 2019-04-08 NOTE — PROGRESS NOTES
"Subjective:       Patient ID: Pj Malone is a 68 y.o. male.    Chief Complaint: Annual Exam (LEFT hip "burning" sensation); Hypertension; and no erection (6mths )    Here for f/u htn and lipids. Had good cardiac check recently.  Doing well overall.    Hypertension   This is a chronic problem. The current episode started more than 1 year ago. The problem is controlled. Pertinent negatives include no chest pain, palpitations or shortness of breath.   Hyperlipidemia   This is a chronic problem. The current episode started more than 1 year ago. The problem is controlled. Pertinent negatives include no chest pain or shortness of breath.     Review of Systems   Constitutional: Negative for chills, fatigue and fever.   Respiratory: Negative for cough, chest tightness and shortness of breath.    Cardiovascular: Negative for chest pain, palpitations and leg swelling.   Endocrine: Negative for cold intolerance and heat intolerance.   Skin: Negative for rash.   Psychiatric/Behavioral: Negative for dysphoric mood. The patient is not nervous/anxious.        Objective:      Physical Exam   Constitutional: He appears well-developed and well-nourished.   HENT:   Head: Normocephalic and atraumatic.   Cardiovascular: Normal rate, regular rhythm and normal heart sounds.   Pulmonary/Chest: Effort normal and breath sounds normal.   Psychiatric: He has a normal mood and affect.   Nursing note and vitals reviewed.      Assessment:       1. Essential hypertension    2. Dyslipidemia    3. Screening PSA (prostate specific antigen)    4. Screening for AAA (abdominal aortic aneurysm)        Plan:       Essential hypertension  -     CBC auto differential; Future; Expected date: 04/08/2019  -     Comprehensive metabolic panel; Future; Expected date: 04/08/2019  -     Lipid panel; Future; Expected date: 04/08/2019  -     TSH; Future; Expected date: 04/08/2019    Dyslipidemia  -     CBC auto differential; Future; Expected date: 04/08/2019  -    "  Comprehensive metabolic panel; Future; Expected date: 04/08/2019  -     Lipid panel; Future; Expected date: 04/08/2019  -     TSH; Future; Expected date: 04/08/2019    Screening PSA (prostate specific antigen)  -     PSA, Screening; Future; Expected date: 04/08/2019    Screening for AAA (abdominal aortic aneurysm)  -     US Abdominal Aorta; Future; Expected date: 04/08/2019    Other orders  -     sildenafil (VIAGRA) 100 MG tablet; Take 1 tablet (100 mg total) by mouth daily as needed for Erectile Dysfunction.  Dispense: 5 tablet; Refill: 2  -     Discontinue: gabapentin (NEURONTIN) 300 MG capsule; TAKE 1 CAPSULE(600MG) BY MOUTH EVERY NIGHT AS NEEDED  Dispense: 90 capsule; Refill: 1  -     gabapentin (NEURONTIN) 600 MG tablet; Take 1 tablet (600 mg total) by mouth daily as needed.  Dispense: 90 tablet; Refill: 0      Update labs and health maintenance.  Will monitor chronic medical issues and continue current plan of care.    Follow up in about 6 months (around 10/8/2019), or if symptoms worsen or fail to improve.

## 2019-04-09 LAB
ASCENDING AORTA: 3.18 CM
AV INDEX (PROSTH): 0.63
AV MEAN GRADIENT: 5.3 MMHG
AV PEAK GRADIENT: 9.36 MMHG
AV VALVE AREA: 2.98 CM2
AV VELOCITY RATIO: 0.66
BSA FOR ECHO PROCEDURE: 2.56 M2
CV ECHO LV RWT: 0.45 CM
DOP CALC AO PEAK VEL: 1.53 M/S
DOP CALC AO VTI: 30.73 CM
DOP CALC LVOT AREA: 4.75 CM2
DOP CALC LVOT DIAMETER: 2.46 CM
DOP CALC LVOT PEAK VEL: 1.01 M/S
DOP CALC LVOT STROKE VOLUME: 91.64 CM3
DOP CALCLVOT PEAK VEL VTI: 19.29 CM
E WAVE DECELERATION TIME: 193.52 MSEC
E/A RATIO: 1.1
E/E' RATIO: 7.33
ECHO LV POSTERIOR WALL: 1.02 CM (ref 0.6–1.1)
FRACTIONAL SHORTENING: 31 % (ref 28–44)
INTERVENTRICULAR SEPTUM: 0.92 CM (ref 0.6–1.1)
LA MAJOR: 5.53 CM
LA MINOR: 5.46 CM
LA WIDTH: 5.08 CM
LEFT ATRIUM SIZE: 4.27 CM
LEFT ATRIUM VOLUME INDEX: 40.9 ML/M2
LEFT ATRIUM VOLUME: 101.31 CM3
LEFT INTERNAL DIMENSION IN SYSTOLE: 3.18 CM (ref 2.1–4)
LEFT VENTRICLE DIASTOLIC VOLUME INDEX: 38.96 ML/M2
LEFT VENTRICLE DIASTOLIC VOLUME: 96.47 ML
LEFT VENTRICLE MASS INDEX: 61.1 G/M2
LEFT VENTRICLE SYSTOLIC VOLUME INDEX: 16.3 ML/M2
LEFT VENTRICLE SYSTOLIC VOLUME: 40.3 ML
LEFT VENTRICULAR INTERNAL DIMENSION IN DIASTOLE: 4.58 CM (ref 3.5–6)
LEFT VENTRICULAR MASS: 151.27 G
LV LATERAL E/E' RATIO: 5.5
LV SEPTAL E/E' RATIO: 11
MV PEAK A VEL: 0.5 M/S
MV PEAK E VEL: 0.55 M/S
PISA TR MAX VEL: 1.73 M/S
PULM VEIN S/D RATIO: 1.17
PV PEAK D VEL: 0.47 M/S
PV PEAK S VEL: 0.55 M/S
RA MAJOR: 5.16 CM
RA PRESSURE: 3 MMHG
RA WIDTH: 4.27 CM
RIGHT VENTRICULAR END-DIASTOLIC DIMENSION: 4.61 CM
SINUS: 2.91 CM
STJ: 2.97 CM
TDI LATERAL: 0.1
TDI SEPTAL: 0.05
TDI: 0.08
TR MAX PG: 11.97 MMHG
TRICUSPID ANNULAR PLANE SYSTOLIC EXCURSION: 2.3 CM
TV REST PULMONARY ARTERY PRESSURE: 15 MMHG

## 2019-04-12 ENCOUNTER — TELEPHONE (OUTPATIENT)
Dept: CARDIOLOGY | Facility: CLINIC | Age: 69
End: 2019-04-12

## 2019-04-30 ENCOUNTER — EXTERNAL CHRONIC CARE MANAGEMENT (OUTPATIENT)
Dept: PRIMARY CARE CLINIC | Facility: CLINIC | Age: 69
End: 2019-04-30
Payer: MEDICARE

## 2019-04-30 PROCEDURE — 99490 CHRNC CARE MGMT STAFF 1ST 20: CPT | Mod: S$PBB,,, | Performed by: FAMILY MEDICINE

## 2019-04-30 PROCEDURE — 99490 PR CHRONIC CARE MGMT, 1ST 20 MIN: ICD-10-PCS | Mod: S$PBB,,, | Performed by: FAMILY MEDICINE

## 2019-04-30 PROCEDURE — 99490 CHRNC CARE MGMT STAFF 1ST 20: CPT | Mod: PBBFAC,PO | Performed by: FAMILY MEDICINE

## 2019-05-31 ENCOUNTER — EXTERNAL CHRONIC CARE MANAGEMENT (OUTPATIENT)
Dept: PRIMARY CARE CLINIC | Facility: CLINIC | Age: 69
End: 2019-05-31
Payer: MEDICARE

## 2019-05-31 PROCEDURE — 99490 PR CHRONIC CARE MGMT, 1ST 20 MIN: ICD-10-PCS | Mod: S$PBB,,, | Performed by: FAMILY MEDICINE

## 2019-05-31 PROCEDURE — 99490 CHRNC CARE MGMT STAFF 1ST 20: CPT | Mod: S$PBB,,, | Performed by: FAMILY MEDICINE

## 2019-05-31 PROCEDURE — 99490 CHRNC CARE MGMT STAFF 1ST 20: CPT | Mod: PBBFAC,PO | Performed by: FAMILY MEDICINE

## 2019-06-25 DIAGNOSIS — I10 ESSENTIAL HYPERTENSION: ICD-10-CM

## 2019-06-25 NOTE — PROGRESS NOTES
Refill Authorization Note     is requesting a refill authorization.    Brief assessment and rationale for refill: APPROVE: prr  Name and strength of medication: LISINOPRIL 20MG TABLETS       Medication Therapy Plan: HTN LCO controlled 4/19; Labs WNL 4/19; approve 9 more    Medication reconciliation completed: No              How patient will take medication: t1t qd          Comments:   Blood Pressure Readings: <139/89mmHg (12 months)  BP Readings from Last 3 Encounters:   04/08/19 124/72   04/08/19 132/73   02/05/19 116/77        Last Creatinine/ Potassium/ Sodium(diuretics)) (6 months: Diuretics-(Cr/K/Na)  or 12 months: non-diuretics)  Lab Results   Component Value Date    CREATININE 1.0 04/08/2019    CREATININE 1.0 12/13/2017    CREATININE 1.0 06/26/2017        Lab Results   Component Value Date    K 5.0 04/08/2019    K 4.6 12/13/2017    K 4.3 06/26/2017    Lab Results   Component Value Date     04/08/2019     12/13/2017     06/26/2017        Digital Hypertension Data (if enrolled)     Last 5 Patient Entered Readings                                      Current 30 Day Average:      There is no flowsheet data to display.          APPOINTMENTS (past 12m or future 3m authorizing provider)  LAST VISIT DATE  NAVA López MD 4/8/2019         NEXT VISIT DATE  NAVA López MD 10/8/2019

## 2019-06-26 RX ORDER — LISINOPRIL 20 MG/1
TABLET ORAL
Qty: 90 TABLET | Refills: 2 | Status: SHIPPED | OUTPATIENT
Start: 2019-06-26 | End: 2019-10-08 | Stop reason: SDUPTHER

## 2019-08-20 ENCOUNTER — PES CALL (OUTPATIENT)
Dept: ADMINISTRATIVE | Facility: CLINIC | Age: 69
End: 2019-08-20

## 2019-09-23 ENCOUNTER — PES CALL (OUTPATIENT)
Dept: ADMINISTRATIVE | Facility: CLINIC | Age: 69
End: 2019-09-23

## 2019-09-26 ENCOUNTER — PATIENT OUTREACH (OUTPATIENT)
Dept: ADMINISTRATIVE | Facility: HOSPITAL | Age: 69
End: 2019-09-26

## 2019-10-04 DIAGNOSIS — I10 ESSENTIAL HYPERTENSION: ICD-10-CM

## 2019-10-07 DIAGNOSIS — I25.10 ATHEROSCLEROSIS OF NATIVE CORONARY ARTERY WITHOUT ANGINA PECTORIS, UNSPECIFIED WHETHER NATIVE OR TRANSPLANTED HEART: ICD-10-CM

## 2019-10-07 DIAGNOSIS — I10 ESSENTIAL HYPERTENSION: ICD-10-CM

## 2019-10-07 RX ORDER — METOPROLOL SUCCINATE 25 MG/1
TABLET, EXTENDED RELEASE ORAL
Qty: 90 TABLET | Refills: 0 | Status: CANCELLED | OUTPATIENT
Start: 2019-10-07

## 2019-10-07 RX ORDER — LISINOPRIL 20 MG/1
TABLET ORAL
Qty: 90 TABLET | Refills: 2 | Status: CANCELLED | OUTPATIENT
Start: 2019-10-07

## 2019-10-07 RX ORDER — PRAVASTATIN SODIUM 40 MG/1
TABLET ORAL
Qty: 90 TABLET | Refills: 0 | Status: CANCELLED | OUTPATIENT
Start: 2019-10-07

## 2019-10-07 RX ORDER — LISINOPRIL 20 MG/1
TABLET ORAL
Qty: 90 TABLET | Refills: 0 | OUTPATIENT
Start: 2019-10-07

## 2019-10-07 NOTE — PROGRESS NOTES
Quick DC. Refill Too Soon  Refill Authorization Note     is requesting a refill authorization.    Brief assessment and rationale for refill: QUICK DC: rts(3/20)  Name and strength of medication: LISINOPRIL 20MG TABLETS       Medication Therapy Plan: Quick DC rts see details below    Medication reconciliation completed: No              How patient will take medication: t1t po qd           Comments:   Last Prescribed Info:     Authorizing Provider: NAVA López MD TYRA #:  [id number] NPI:  [id number]    Ordering User:  Reuben RutledgeD            Diagnosis Association: Essential hypertension (I10)      Original Order:  lisinopril (PRINIVIL,ZESTRIL) 20 MG tablet [[id number]]      Pharmacy:  [facility] #[id number] [address]          Outpatient Medication Detail      Disp Refills Start End    lisinopril (PRINIVIL,ZESTRIL) 20 MG tablet 90 tablet 2 6/26/2019     Sig: TAKE 1 TABLET(20 MG) BY MOUTH EVERY DAY    Sent to pharmacy as: lisinopril (PRINIVIL,ZESTRIL) 20 MG tablet    E-Prescribing Status: Receipt confirmed by pharmacy (6/26/2019  1:42 PM CDT)

## 2019-10-07 NOTE — PROGRESS NOTES
Refill Authorization Note     is requesting a refill authorization.    Brief assessment and rationale for refill: QUICK DC: rts(3/20)  Name and strength of medication: LISINOPRIL 20MG TABLETS            Medication reconciliation completed: No              How patient will take medication: t1t po qd           Comments:   Last Kidney  Lab Results   Component Value Date    CREATININE 1.0 04/08/2019    CREATININE 1.0 12/13/2017    CREATININE 1.0 06/26/2017     Lab Results   Component Value Date    K 5.0 04/08/2019    K 4.6 12/13/2017    K 4.3 06/26/2017     Lab Results   Component Value Date     04/08/2019     12/13/2017     06/26/2017      Lab Results   Component Value Date    BUN 18 04/08/2019    BUN 14 12/13/2017    BUN 16 06/26/2017     Lab Results   Component Value Date    CO2 32 (H) 04/08/2019    CO2 27 12/13/2017    CO2 25 06/26/2017        BP Readings from Last 3 Encounters:   04/08/19 124/72   04/08/19 132/73   02/05/19 116/77       Appointments past 12m or future 3m    Date Provider   Last Visit   4/8/2019 NAVA López MD   Next Visit   10/8/2019 NAVA López MD     Last Prescribed Info:     Ordering Provider: NAVA López MD TYRA #:  [id number] NPI:  [id number]    Authorizing Provider: NAVA López MD TYRA #:  [id number] NPI:  [id number]    Ordering User:  Reuben RutledgeD            Diagnosis Association: Essential hypertension (I10)      Original Order:  lisinopril (PRINIVIL,ZESTRIL) 20 MG tablet [[id number]]      Pharmacy:  [facility] #[id number] [address] TYRA #:  [id number]     Pharmacy Comments:  --          Fill quantity remaining:  -- Fill quantity used:  -- Next fill due: --       Outpatient Medication Detail      Disp Refills Start End    lisinopril (PRINIVIL,ZESTRIL) 20 MG tablet 90 tablet 2 6/26/2019     Sig: TAKE 1 TABLET(20 MG) BY MOUTH EVERY DAY    Sent to pharmacy as:  lisinopril (PRINIVIL,ZESTRIL) 20 MG tablet    E-Prescribing Status: Receipt confirmed by pharmacy (6/26/2019  1:42 PM CDT)

## 2019-10-07 NOTE — TELEPHONE ENCOUNTER
----- Message from Shelley Bartlett sent at 10/7/2019 11:39 AM CDT -----  Contact: Pj rock  Type:  RX Refill Request    Who Called:  Pj  Refill or New Rx:  refill  RX Name and Strength:  pravastatin (PRAVACHOL) 40 MG tablet, lisinopril (PRINIVIL,ZESTRIL) 20 MG tablet, metoprolol succinate (TOPROL-XL) 25 MG 24 hr tablet  How is the patient currently taking it? (ex. 1XDay):  As directed  Is this a 30 day or 90 day RX:  90  Preferred Pharmacy with phone number:    [facility] #[id number] [place], [place] - [address] [place] [address] AVE [facility]  [address] [facility] [address]  Phone: [phone] Fax: [phone]      Local or Mail Order:  local  Ordering Provider:  Rene Hernández Call Back Number:  [phone]  Additional Information:  Pls call pt if any issues w/ refill

## 2019-10-08 ENCOUNTER — OFFICE VISIT (OUTPATIENT)
Dept: FAMILY MEDICINE | Facility: CLINIC | Age: 69
End: 2019-10-08
Payer: MEDICARE

## 2019-10-08 ENCOUNTER — LAB VISIT (OUTPATIENT)
Dept: LAB | Facility: HOSPITAL | Age: 69
End: 2019-10-08
Attending: FAMILY MEDICINE
Payer: MEDICARE

## 2019-10-08 VITALS
HEIGHT: 72 IN | WEIGHT: 299.19 LBS | HEART RATE: 80 BPM | DIASTOLIC BLOOD PRESSURE: 86 MMHG | SYSTOLIC BLOOD PRESSURE: 124 MMHG | BODY MASS INDEX: 40.52 KG/M2

## 2019-10-08 DIAGNOSIS — E78.5 DYSLIPIDEMIA: ICD-10-CM

## 2019-10-08 DIAGNOSIS — I25.10 ATHEROSCLEROSIS OF NATIVE CORONARY ARTERY WITHOUT ANGINA PECTORIS, UNSPECIFIED WHETHER NATIVE OR TRANSPLANTED HEART: ICD-10-CM

## 2019-10-08 DIAGNOSIS — E66.01 MORBID OBESITY: ICD-10-CM

## 2019-10-08 DIAGNOSIS — I10 ESSENTIAL HYPERTENSION: Primary | ICD-10-CM

## 2019-10-08 DIAGNOSIS — I10 ESSENTIAL HYPERTENSION: ICD-10-CM

## 2019-10-08 LAB
ALBUMIN SERPL BCP-MCNC: 4.3 G/DL (ref 3.5–5.2)
ALP SERPL-CCNC: 90 U/L (ref 55–135)
ALT SERPL W/O P-5'-P-CCNC: 30 U/L (ref 10–44)
ANION GAP SERPL CALC-SCNC: 10 MMOL/L (ref 8–16)
AST SERPL-CCNC: 27 U/L (ref 10–40)
BASOPHILS # BLD AUTO: 0.07 K/UL (ref 0–0.2)
BASOPHILS NFR BLD: 0.5 % (ref 0–1.9)
BILIRUB SERPL-MCNC: 0.4 MG/DL (ref 0.1–1)
BUN SERPL-MCNC: 17 MG/DL (ref 8–23)
CALCIUM SERPL-MCNC: 10 MG/DL (ref 8.7–10.5)
CHLORIDE SERPL-SCNC: 101 MMOL/L (ref 95–110)
CHOLEST SERPL-MCNC: 208 MG/DL (ref 120–199)
CHOLEST/HDLC SERPL: 4.7 {RATIO} (ref 2–5)
CO2 SERPL-SCNC: 26 MMOL/L (ref 23–29)
CREAT SERPL-MCNC: 0.9 MG/DL (ref 0.5–1.4)
DIFFERENTIAL METHOD: ABNORMAL
EOSINOPHIL # BLD AUTO: 0.2 K/UL (ref 0–0.5)
EOSINOPHIL NFR BLD: 1.7 % (ref 0–8)
ERYTHROCYTE [DISTWIDTH] IN BLOOD BY AUTOMATED COUNT: 13.3 % (ref 11.5–14.5)
EST. GFR  (AFRICAN AMERICAN): >60 ML/MIN/1.73 M^2
EST. GFR  (NON AFRICAN AMERICAN): >60 ML/MIN/1.73 M^2
GLUCOSE SERPL-MCNC: 95 MG/DL (ref 70–110)
HCT VFR BLD AUTO: 47.6 % (ref 40–54)
HDLC SERPL-MCNC: 44 MG/DL (ref 40–75)
HDLC SERPL: 21.2 % (ref 20–50)
HGB BLD-MCNC: 15.2 G/DL (ref 14–18)
IMM GRANULOCYTES # BLD AUTO: 0.05 K/UL (ref 0–0.04)
IMM GRANULOCYTES NFR BLD AUTO: 0.4 % (ref 0–0.5)
LDLC SERPL CALC-MCNC: 90.2 MG/DL (ref 63–159)
LYMPHOCYTES # BLD AUTO: 2.8 K/UL (ref 1–4.8)
LYMPHOCYTES NFR BLD: 21.2 % (ref 18–48)
MCH RBC QN AUTO: 29.1 PG (ref 27–31)
MCHC RBC AUTO-ENTMCNC: 31.9 G/DL (ref 32–36)
MCV RBC AUTO: 91 FL (ref 82–98)
MONOCYTES # BLD AUTO: 0.9 K/UL (ref 0.3–1)
MONOCYTES NFR BLD: 6.9 % (ref 4–15)
NEUTROPHILS # BLD AUTO: 9.2 K/UL (ref 1.8–7.7)
NEUTROPHILS NFR BLD: 69.3 % (ref 38–73)
NONHDLC SERPL-MCNC: 164 MG/DL
NRBC BLD-RTO: 0 /100 WBC
PLATELET # BLD AUTO: 271 K/UL (ref 150–350)
PMV BLD AUTO: 10.5 FL (ref 9.2–12.9)
POTASSIUM SERPL-SCNC: 4.3 MMOL/L (ref 3.5–5.1)
PROT SERPL-MCNC: 8 G/DL (ref 6–8.4)
RBC # BLD AUTO: 5.22 M/UL (ref 4.6–6.2)
SODIUM SERPL-SCNC: 137 MMOL/L (ref 136–145)
TRIGL SERPL-MCNC: 369 MG/DL (ref 30–150)
TSH SERPL DL<=0.005 MIU/L-ACNC: 0.97 UIU/ML (ref 0.4–4)
WBC # BLD AUTO: 13.28 K/UL (ref 3.9–12.7)

## 2019-10-08 PROCEDURE — 99999 PR PBB SHADOW E&M-EST. PATIENT-LVL III: CPT | Mod: PBBFAC,,, | Performed by: FAMILY MEDICINE

## 2019-10-08 PROCEDURE — 99214 PR OFFICE/OUTPT VISIT, EST, LEVL IV, 30-39 MIN: ICD-10-PCS | Mod: S$PBB,,, | Performed by: FAMILY MEDICINE

## 2019-10-08 PROCEDURE — 99213 OFFICE O/P EST LOW 20 MIN: CPT | Mod: PBBFAC,PO | Performed by: FAMILY MEDICINE

## 2019-10-08 PROCEDURE — 85025 COMPLETE CBC W/AUTO DIFF WBC: CPT

## 2019-10-08 PROCEDURE — 99214 OFFICE O/P EST MOD 30 MIN: CPT | Mod: S$PBB,,, | Performed by: FAMILY MEDICINE

## 2019-10-08 PROCEDURE — 99999 PR PBB SHADOW E&M-EST. PATIENT-LVL III: ICD-10-PCS | Mod: PBBFAC,,, | Performed by: FAMILY MEDICINE

## 2019-10-08 PROCEDURE — 80061 LIPID PANEL: CPT

## 2019-10-08 PROCEDURE — 80053 COMPREHEN METABOLIC PANEL: CPT

## 2019-10-08 PROCEDURE — 36415 COLL VENOUS BLD VENIPUNCTURE: CPT | Mod: PO

## 2019-10-08 PROCEDURE — 84443 ASSAY THYROID STIM HORMONE: CPT

## 2019-10-08 RX ORDER — SILDENAFIL 100 MG/1
100 TABLET, FILM COATED ORAL DAILY PRN
Qty: 10 TABLET | Refills: 2 | Status: SHIPPED | OUTPATIENT
Start: 2019-10-08 | End: 2021-12-14

## 2019-10-08 RX ORDER — LISINOPRIL 20 MG/1
TABLET ORAL
Qty: 90 TABLET | Refills: 3 | Status: SHIPPED | OUTPATIENT
Start: 2019-10-08 | End: 2020-12-01 | Stop reason: SDUPTHER

## 2019-10-08 RX ORDER — PRAVASTATIN SODIUM 40 MG/1
TABLET ORAL
Qty: 90 TABLET | Refills: 3 | Status: SHIPPED | OUTPATIENT
Start: 2019-10-08 | End: 2020-12-01 | Stop reason: SDUPTHER

## 2019-10-08 RX ORDER — METOPROLOL SUCCINATE 25 MG/1
TABLET, EXTENDED RELEASE ORAL
Qty: 90 TABLET | Refills: 3 | Status: SHIPPED | OUTPATIENT
Start: 2019-10-08 | End: 2020-12-01 | Stop reason: SDUPTHER

## 2019-10-08 RX ORDER — ASPIRIN 325 MG
325 TABLET ORAL DAILY
COMMUNITY
End: 2021-12-14

## 2019-10-08 RX ORDER — GABAPENTIN 600 MG/1
600 TABLET ORAL DAILY PRN
Qty: 90 TABLET | Refills: 3 | Status: SHIPPED | OUTPATIENT
Start: 2019-10-08 | End: 2021-05-24 | Stop reason: SDUPTHER

## 2019-10-08 NOTE — PROGRESS NOTES
Subjective:       Patient ID: Pj Malone is a 68 y.o. male.    Chief Complaint: Hypertension    Here for f/u htn and lipids. Doing well overall. Due for labs.    Hypertension   This is a chronic problem. The current episode started more than 1 year ago. The problem is controlled. Pertinent negatives include no chest pain, palpitations or shortness of breath.   Hyperlipidemia   This is a chronic problem. The current episode started more than 1 year ago. The problem is controlled. Pertinent negatives include no chest pain or shortness of breath.     Review of Systems   Constitutional: Negative for chills, fatigue and fever.   Respiratory: Negative for cough, chest tightness and shortness of breath.    Cardiovascular: Negative for chest pain, palpitations and leg swelling.   Endocrine: Negative for cold intolerance and heat intolerance.   Skin: Negative for rash.   Psychiatric/Behavioral: Negative for dysphoric mood. The patient is not nervous/anxious.        Objective:      Physical Exam   Constitutional: He appears well-developed and well-nourished.   HENT:   Head: Normocephalic and atraumatic.   Cardiovascular: Normal rate, regular rhythm and normal heart sounds.   Pulmonary/Chest: Effort normal and breath sounds normal.   Psychiatric: He has a normal mood and affect.   Nursing note and vitals reviewed.      Assessment:       1. Essential hypertension    2. Dyslipidemia    3. Morbid obesity    4. Atherosclerosis of native coronary artery without angina pectoris, unspecified whether native or transplanted heart        Plan:       Essential hypertension  -     lisinopril (PRINIVIL,ZESTRIL) 20 MG tablet; TAKE 1 TABLET(20 MG) BY MOUTH EVERY DAY  Dispense: 90 tablet; Refill: 3  -     CBC auto differential; Future; Expected date: 10/08/2019  -     Comprehensive metabolic panel; Future; Expected date: 10/08/2019  -     Lipid panel; Future; Expected date: 10/08/2019  -     TSH; Future; Expected date:  10/08/2019    Dyslipidemia  -     CBC auto differential; Future; Expected date: 10/08/2019  -     Comprehensive metabolic panel; Future; Expected date: 10/08/2019  -     Lipid panel; Future; Expected date: 10/08/2019  -     TSH; Future; Expected date: 10/08/2019    Morbid obesity    Atherosclerosis of native coronary artery without angina pectoris, unspecified whether native or transplanted heart  -     pravastatin (PRAVACHOL) 40 MG tablet; TAKE 1 TABLET(40 MG) BY MOUTH EVERY DAY  Dispense: 90 tablet; Refill: 3    Other orders  -     sildenafil (VIAGRA) 100 MG tablet; Take 1 tablet (100 mg total) by mouth daily as needed for Erectile Dysfunction.  Dispense: 10 tablet; Refill: 2  -     metoprolol succinate (TOPROL-XL) 25 MG 24 hr tablet; TAKE 1 TABLET(25 MG) BY MOUTH EVERY DAY  Dispense: 90 tablet; Refill: 3  -     gabapentin (NEURONTIN) 600 MG tablet; Take 1 tablet (600 mg total) by mouth daily as needed.  Dispense: 90 tablet; Refill: 3      Update labs and health maintenance  Will monitor chronic medical issues and continue current plan of care.      Follow up in about 6 months (around 4/8/2020), or if symptoms worsen or fail to improve.

## 2019-10-09 ENCOUNTER — PES CALL (OUTPATIENT)
Dept: ADMINISTRATIVE | Facility: CLINIC | Age: 69
End: 2019-10-09

## 2020-02-24 ENCOUNTER — TELEPHONE (OUTPATIENT)
Dept: FAMILY MEDICINE | Facility: CLINIC | Age: 70
End: 2020-02-24

## 2020-02-24 NOTE — TELEPHONE ENCOUNTER
Called pt to see if pt wanted to r/s his 3/31 appt from [place] to [place]. No answer, no voice mail

## 2020-09-21 ENCOUNTER — LAB VISIT (OUTPATIENT)
Dept: LAB | Facility: HOSPITAL | Age: 70
End: 2020-09-21
Attending: FAMILY MEDICINE
Payer: MEDICARE

## 2020-09-21 ENCOUNTER — OFFICE VISIT (OUTPATIENT)
Dept: FAMILY MEDICINE | Facility: CLINIC | Age: 70
End: 2020-09-21
Payer: MEDICARE

## 2020-09-21 VITALS
HEIGHT: 72 IN | SYSTOLIC BLOOD PRESSURE: 136 MMHG | HEART RATE: 81 BPM | WEIGHT: 300.06 LBS | BODY MASS INDEX: 40.64 KG/M2 | OXYGEN SATURATION: 96 % | DIASTOLIC BLOOD PRESSURE: 84 MMHG | TEMPERATURE: 98 F

## 2020-09-21 DIAGNOSIS — R73.09 ELEVATED GLUCOSE: ICD-10-CM

## 2020-09-21 DIAGNOSIS — I10 ESSENTIAL HYPERTENSION: ICD-10-CM

## 2020-09-21 DIAGNOSIS — Z86.010 HISTORY OF COLON POLYPS: ICD-10-CM

## 2020-09-21 DIAGNOSIS — Z12.5 SCREENING PSA (PROSTATE SPECIFIC ANTIGEN): ICD-10-CM

## 2020-09-21 DIAGNOSIS — E78.5 DYSLIPIDEMIA: ICD-10-CM

## 2020-09-21 DIAGNOSIS — Z12.11 SCREEN FOR COLON CANCER: ICD-10-CM

## 2020-09-21 DIAGNOSIS — I10 ESSENTIAL HYPERTENSION: Primary | ICD-10-CM

## 2020-09-21 DIAGNOSIS — E66.01 MORBID OBESITY: ICD-10-CM

## 2020-09-21 DIAGNOSIS — Z13.6 SCREENING FOR AAA (ABDOMINAL AORTIC ANEURYSM): ICD-10-CM

## 2020-09-21 PROBLEM — E66.9 OBESITY: Status: RESOLVED | Noted: 2019-02-05 | Resolved: 2020-09-21

## 2020-09-21 LAB
BASOPHILS # BLD AUTO: 0.06 K/UL (ref 0–0.2)
BASOPHILS NFR BLD: 0.6 % (ref 0–1.9)
DIFFERENTIAL METHOD: ABNORMAL
EOSINOPHIL # BLD AUTO: 0.2 K/UL (ref 0–0.5)
EOSINOPHIL NFR BLD: 2.6 % (ref 0–8)
ERYTHROCYTE [DISTWIDTH] IN BLOOD BY AUTOMATED COUNT: 13.4 % (ref 11.5–14.5)
HCT VFR BLD AUTO: 48.7 % (ref 40–54)
HGB BLD-MCNC: 15.1 G/DL (ref 14–18)
IMM GRANULOCYTES # BLD AUTO: 0.05 K/UL (ref 0–0.04)
IMM GRANULOCYTES NFR BLD AUTO: 0.5 % (ref 0–0.5)
LYMPHOCYTES # BLD AUTO: 2.2 K/UL (ref 1–4.8)
LYMPHOCYTES NFR BLD: 23.4 % (ref 18–48)
MCH RBC QN AUTO: 28.4 PG (ref 27–31)
MCHC RBC AUTO-ENTMCNC: 31 G/DL (ref 32–36)
MCV RBC AUTO: 92 FL (ref 82–98)
MONOCYTES # BLD AUTO: 0.7 K/UL (ref 0.3–1)
MONOCYTES NFR BLD: 7.4 % (ref 4–15)
NEUTROPHILS # BLD AUTO: 6.1 K/UL (ref 1.8–7.7)
NEUTROPHILS NFR BLD: 65.5 % (ref 38–73)
NRBC BLD-RTO: 0 /100 WBC
PLATELET # BLD AUTO: 244 K/UL (ref 150–350)
PMV BLD AUTO: 10.5 FL (ref 9.2–12.9)
RBC # BLD AUTO: 5.31 M/UL (ref 4.6–6.2)
WBC # BLD AUTO: 9.34 K/UL (ref 3.9–12.7)

## 2020-09-21 PROCEDURE — 99999 PR PBB SHADOW E&M-EST. PATIENT-LVL IV: ICD-10-PCS | Mod: PBBFAC,,, | Performed by: FAMILY MEDICINE

## 2020-09-21 PROCEDURE — 99999 PR PBB SHADOW E&M-EST. PATIENT-LVL IV: CPT | Mod: PBBFAC,,, | Performed by: FAMILY MEDICINE

## 2020-09-21 PROCEDURE — 80061 LIPID PANEL: CPT

## 2020-09-21 PROCEDURE — 84153 ASSAY OF PSA TOTAL: CPT

## 2020-09-21 PROCEDURE — 83036 HEMOGLOBIN GLYCOSYLATED A1C: CPT

## 2020-09-21 PROCEDURE — 99214 OFFICE O/P EST MOD 30 MIN: CPT | Mod: PBBFAC,PO | Performed by: FAMILY MEDICINE

## 2020-09-21 PROCEDURE — 99214 OFFICE O/P EST MOD 30 MIN: CPT | Mod: S$PBB,,, | Performed by: FAMILY MEDICINE

## 2020-09-21 PROCEDURE — 80053 COMPREHEN METABOLIC PANEL: CPT

## 2020-09-21 PROCEDURE — 85025 COMPLETE CBC W/AUTO DIFF WBC: CPT

## 2020-09-21 PROCEDURE — 84443 ASSAY THYROID STIM HORMONE: CPT

## 2020-09-21 PROCEDURE — 36415 COLL VENOUS BLD VENIPUNCTURE: CPT | Mod: PO

## 2020-09-21 PROCEDURE — 99214 PR OFFICE/OUTPT VISIT, EST, LEVL IV, 30-39 MIN: ICD-10-PCS | Mod: S$PBB,,, | Performed by: FAMILY MEDICINE

## 2020-09-21 NOTE — PROGRESS NOTES
Subjective:       Patient ID: Pj Malone is a 69 y.o. male.    Chief Complaint: Follow-up    Here for f/u htn and chronic medical issues. Doing well overall.     Hypertension  This is a chronic problem. The current episode started more than 1 year ago. The problem is controlled. Pertinent negatives include no chest pain, palpitations or shortness of breath.     Review of Systems   Constitutional: Negative for chills and fever.   Respiratory: Negative for cough, chest tightness and shortness of breath.    Cardiovascular: Negative for chest pain, palpitations and leg swelling.   Endocrine: Negative for cold intolerance and heat intolerance.   Psychiatric/Behavioral: Negative for decreased concentration. The patient is not nervous/anxious.        Objective:      Physical Exam  Vitals signs and nursing note reviewed.   Constitutional:       Appearance: He is well-developed.   HENT:      Head: Normocephalic and atraumatic.   Cardiovascular:      Rate and Rhythm: Normal rate and regular rhythm.      Heart sounds: Normal heart sounds.   Pulmonary:      Effort: Pulmonary effort is normal.      Breath sounds: Normal breath sounds.         Assessment:       1. Essential hypertension    2. Dyslipidemia    3. Morbid obesity    4. Screening PSA (prostate specific antigen)    5. Elevated glucose    6. Screen for colon cancer    7. History of colon polyps    8. Screening for AAA (abdominal aortic aneurysm)        Plan:       Essential hypertension  -     CBC auto differential; Future; Expected date: 09/21/2020  -     Comprehensive metabolic panel; Future; Expected date: 09/21/2020  -     Lipid Panel; Future; Expected date: 09/21/2020  -     TSH; Future; Expected date: 09/21/2020    Dyslipidemia    Morbid obesity    Screening PSA (prostate specific antigen)  -     PSA, Screening; Future; Expected date: 09/21/2020    Elevated glucose  -     Hemoglobin A1C; Future; Expected date: 09/21/2020    Screen for colon cancer  -     Case  request GI: COLONOSCOPY    History of colon polyps  -     Case request GI: COLONOSCOPY    Screening for AAA (abdominal aortic aneurysm)  -     US Abdominal Aorta; Future; Expected date: 09/21/2020      htn stable  Lipid stable  Diet/exercise and wt loss      Follow up in about 6 months (around 3/21/2021), or if symptoms worsen or fail to improve.

## 2020-09-22 LAB
ALBUMIN SERPL BCP-MCNC: 4.1 G/DL (ref 3.5–5.2)
ALP SERPL-CCNC: 79 U/L (ref 55–135)
ALT SERPL W/O P-5'-P-CCNC: 37 U/L (ref 10–44)
ANION GAP SERPL CALC-SCNC: 10 MMOL/L (ref 8–16)
AST SERPL-CCNC: 26 U/L (ref 10–40)
BILIRUB SERPL-MCNC: 0.4 MG/DL (ref 0.1–1)
BUN SERPL-MCNC: 11 MG/DL (ref 8–23)
CALCIUM SERPL-MCNC: 9.5 MG/DL (ref 8.7–10.5)
CHLORIDE SERPL-SCNC: 100 MMOL/L (ref 95–110)
CHOLEST SERPL-MCNC: 164 MG/DL (ref 120–199)
CHOLEST/HDLC SERPL: 4.4 {RATIO} (ref 2–5)
CO2 SERPL-SCNC: 29 MMOL/L (ref 23–29)
COMPLEXED PSA SERPL-MCNC: 0.35 NG/ML (ref 0–4)
CREAT SERPL-MCNC: 1 MG/DL (ref 0.5–1.4)
EST. GFR  (AFRICAN AMERICAN): >60 ML/MIN/1.73 M^2
EST. GFR  (NON AFRICAN AMERICAN): >60 ML/MIN/1.73 M^2
ESTIMATED AVG GLUCOSE: 128 MG/DL (ref 68–131)
GLUCOSE SERPL-MCNC: 97 MG/DL (ref 70–110)
HBA1C MFR BLD HPLC: 6.1 % (ref 4–5.6)
HDLC SERPL-MCNC: 37 MG/DL (ref 40–75)
HDLC SERPL: 22.6 % (ref 20–50)
LDLC SERPL CALC-MCNC: 65.6 MG/DL (ref 63–159)
NONHDLC SERPL-MCNC: 127 MG/DL
POTASSIUM SERPL-SCNC: 4.3 MMOL/L (ref 3.5–5.1)
PROT SERPL-MCNC: 7.3 G/DL (ref 6–8.4)
SODIUM SERPL-SCNC: 139 MMOL/L (ref 136–145)
TRIGL SERPL-MCNC: 307 MG/DL (ref 30–150)
TSH SERPL DL<=0.005 MIU/L-ACNC: 0.86 UIU/ML (ref 0.4–4)

## 2020-10-01 ENCOUNTER — HOSPITAL ENCOUNTER (OUTPATIENT)
Dept: RADIOLOGY | Facility: HOSPITAL | Age: 70
Discharge: HOME | End: 2020-10-01
Attending: FAMILY MEDICINE
Payer: MEDICARE

## 2020-10-01 DIAGNOSIS — Z13.6 SCREENING FOR AAA (ABDOMINAL AORTIC ANEURYSM): ICD-10-CM

## 2020-10-01 PROCEDURE — 76775 US EXAM ABDO BACK WALL LIM: CPT | Mod: 26,,, | Performed by: RADIOLOGY

## 2020-10-01 PROCEDURE — 76775 US ABDOMINAL AORTA: ICD-10-PCS | Mod: 26,,, | Performed by: RADIOLOGY

## 2020-10-01 PROCEDURE — 76775 US EXAM ABDO BACK WALL LIM: CPT | Mod: TC,PO

## 2020-11-03 ENCOUNTER — TELEPHONE (OUTPATIENT)
Dept: GASTROENTEROLOGY | Facility: CLINIC | Age: 70
End: 2020-11-03

## 2020-11-03 NOTE — TELEPHONE ENCOUNTER
Attempted to contact pt, unable to leave vmail. Pt has no active Mychart account. Will attempt to reach at a later time.

## 2020-11-06 ENCOUNTER — TELEPHONE (OUTPATIENT)
Dept: GASTROENTEROLOGY | Facility: CLINIC | Age: 70
End: 2020-11-06

## 2020-11-06 NOTE — TELEPHONE ENCOUNTER
Unable to contact pt to schedule cscope. Pt has no vmail or mychart. Attempted to contact x 2. Order canceled at this time. PCP notified. Phone number provided for call back.

## 2020-12-01 DIAGNOSIS — I25.10 ATHEROSCLEROSIS OF NATIVE CORONARY ARTERY WITHOUT ANGINA PECTORIS, UNSPECIFIED WHETHER NATIVE OR TRANSPLANTED HEART: ICD-10-CM

## 2020-12-01 DIAGNOSIS — I10 ESSENTIAL HYPERTENSION: ICD-10-CM

## 2020-12-01 RX ORDER — METOPROLOL SUCCINATE 25 MG/1
TABLET, EXTENDED RELEASE ORAL
Qty: 90 TABLET | Refills: 3 | Status: SHIPPED | OUTPATIENT
Start: 2020-12-01 | End: 2022-01-29

## 2020-12-01 RX ORDER — PRAVASTATIN SODIUM 40 MG/1
TABLET ORAL
Qty: 90 TABLET | Refills: 3 | Status: SHIPPED | OUTPATIENT
Start: 2020-12-01 | End: 2022-01-29

## 2020-12-01 RX ORDER — LISINOPRIL 20 MG/1
TABLET ORAL
Qty: 90 TABLET | Refills: 3 | Status: SHIPPED | OUTPATIENT
Start: 2020-12-01 | End: 2022-01-29

## 2020-12-01 NOTE — TELEPHONE ENCOUNTER
No new care gaps identified.  Powered by [employer]. Reference number: [id number]. 12/01/2020 9:42:01 AM   CST

## 2020-12-01 NOTE — PROGRESS NOTES
Refill Authorization Note   Pj Malone is requesting a refill authorization.  Brief assessment and rationale for refill: Approve     Medication Therapy Plan: cd    Medication reconciliation completed: No   Comments:   Orders Placed This Encounter    pravastatin (PRAVACHOL) 40 MG tablet    lisinopriL (PRINIVIL,ZESTRIL) 20 MG tablet    metoprolol succinate (TOPROL-XL) 25 MG 24 hr tablet      Requested Prescriptions   Signed Prescriptions Disp Refills    pravastatin (PRAVACHOL) 40 MG tablet 90 tablet 3     Sig: TAKE 1 TABLET(40 MG) BY MOUTH EVERY DAY       Cardiovascular:  Antilipid - Statins Passed - 12/1/2020  9:41 AM        Passed - Patient is at least 18 years old        Passed - Office visit in past 12 months or future 90 days     Recent Outpatient Visits            2 months ago Essential hypertension    [facility] NAVA López MD    1 year ago Essential hypertension    [facility] NAVA López MD    1 year ago Essential hypertension    [facility] NAVA López MD    1 year ago CAD in native artery - occluded RCA with collaterals; mild non-obstructive Dz in LAD, LCX - cath 2007    [facility] Cardiology Claude Jacobs MD    2 years ago Nocturia    [facility] Urology Srinath Ambriz MD          Future Appointments              In 3 months NAVA López MD [facility]                Passed - ALT is 94 or below and within 360 days     ALT   Date Value Ref Range Status   09/21/2020 37 10 - 44 U/L Final   10/08/2019 30 10 - 44 U/L Final   04/08/2019 22 10 - 44 U/L Final              Passed - AST is 54 or below and within 360 days     AST   Date Value Ref Range Status   09/21/2020 26 10 - 40 U/L Final   10/08/2019 27 10 - 40 U/L Final   04/08/2019 25 10 - 40 U/L Final              Passed - Total Cholesterol within 360 days     Cholesterol   Date Value Ref Range Status   09/21/2020 164 120 - 199 mg/dL  Final     Comment:     The National Cholesterol Education Program (NCEP) has set the  following guidelines (reference ranges) for Cholesterol:  Optimal.....................<200 mg/dL  Borderline High.............200-239 mg/dL  High........................> or = 240 mg/dL     10/08/2019 208 (H) 120 - 199 mg/dL Final     Comment:     The National Cholesterol Education Program (NCEP) has set the  following guidelines (reference ranges) for Cholesterol:  Optimal.....................<200 mg/dL  Borderline High.............200-239 mg/dL  High........................> or = 240 mg/dL     04/08/2019 165 120 - 199 mg/dL Final     Comment:     The National Cholesterol Education Program (NCEP) has set the  following guidelines (reference ranges) for Cholesterol:  Optimal.....................<200 mg/dL  Borderline High.............200-239 mg/dL  High........................> or = 240 mg/dL                Passed - LDL within 360 days     LDL Cholesterol   Date Value Ref Range Status   09/21/2020 65.6 63.0 - 159.0 mg/dL Final     Comment:     The National Cholesterol Education Program (NCEP) has set the  following guidelines (reference values) for LDL Cholesterol:  Optimal.......................<130 mg/dL  Borderline High...............130-159 mg/dL  High..........................160-189 mg/dL  Very High.....................>190 mg/dL              Passed - HDL within 360 days     HDL   Date Value Ref Range Status   09/21/2020 37 (L) 40 - 75 mg/dL Final     Comment:     The National Cholesterol Education Program (NCEP) has set the  following guidelines (reference values) for HDL Cholesterol:  Low...............<40 mg/dL  Optimal...........>60 mg/dL              Passed - Triglycerides within 360 days     Triglycerides   Date Value Ref Range Status   09/21/2020 307 (H) 30 - 150 mg/dL Final     Comment:     The National Cholesterol Education Program (NCEP) has set the  following guidelines (reference values) for  triglycerides:  Normal......................<150 mg/dL  Borderline High.............150-199 mg/dL  High........................200-499 mg/dL     10/08/2019 369 (H) 30 - 150 mg/dL Final     Comment:     The National Cholesterol Education Program (NCEP) has set the  following guidelines (reference values) for triglycerides:  Normal......................<150 mg/dL  Borderline High.............150-199 mg/dL  High........................200-499 mg/dL     04/08/2019 233 (H) 30 - 150 mg/dL Final     Comment:     The National Cholesterol Education Program (NCEP) has set the  following guidelines (reference values) for triglycerides:  Normal......................<150 mg/dL  Borderline High.............150-199 mg/dL  High........................200-499 mg/dL                  lisinopriL (PRINIVIL,ZESTRIL) 20 MG tablet 90 tablet 3     Sig: TAKE 1 TABLET(20 MG) BY MOUTH EVERY DAY       Cardiovascular:  ACE Inhibitors Passed - 12/1/2020  9:41 AM        Passed - Patient is at least 18 years old        Passed - Last BP in normal range within 360 days.     BP Readings from Last 3 Encounters:   09/21/20 136/84   10/08/19 124/86   04/08/19 124/72              Passed - Office visit in past 12 months or future 90 days     Recent Outpatient Visits            2 months ago Essential hypertension    [facility] NAVA López MD    1 year ago Essential hypertension    [facility] NAVA López MD    1 year ago Essential hypertension    [facility] Medicine ANVA López MD    1 year ago CAD in native artery - occluded RCA with collaterals; mild non-obstructive Dz in LAD, LCX - cath 2007    [place] [facility] Claude Jacobs MD    2 years ago Nocturia    [place] [facility] Srinath Ambriz MD          Future Appointments              In 3 months NAVA López MD [facility], [place]                Passed - Cr is 1.4 or below and within 360 days      Creatinine   Date Value Ref Range Status   09/21/2020 1.0 0.5 - 1.4 mg/dL Final   10/08/2019 0.9 0.5 - 1.4 mg/dL Final   04/08/2019 1.0 0.5 - 1.4 mg/dL Final              Passed - K in normal range and within 360 days     Potassium   Date Value Ref Range Status   09/21/2020 4.3 3.5 - 5.1 mmol/L Final   10/08/2019 4.3 3.5 - 5.1 mmol/L Final   04/08/2019 5.0 3.5 - 5.1 mmol/L Final              Passed - eGFR within 360 days     eGFR if non    Date Value Ref Range Status   09/21/2020 >60.0 >60 mL/min/1.73 m^2 Final     Comment:     Calculation used to obtain the estimated glomerular filtration  rate (eGFR) is the CKD-EPI equation.      10/08/2019 >60.0 >60 mL/min/1.73 m^2 Final     Comment:     Calculation used to obtain the estimated glomerular filtration  rate (eGFR) is the CKD-EPI equation.      04/08/2019 >60.0 >60 mL/min/1.73 m^2 Final     Comment:     Calculation used to obtain the estimated glomerular filtration  rate (eGFR) is the CKD-EPI equation.        eGFR if    Date Value Ref Range Status   09/21/2020 >60.0 >60 mL/min/1.73 m^2 Final   10/08/2019 >60.0 >60 mL/min/1.73 m^2 Final   04/08/2019 >60.0 >60 mL/min/1.73 m^2 Final                metoprolol succinate (TOPROL-XL) 25 MG 24 hr tablet 90 tablet 3     Sig: TAKE 1 TABLET(25 MG) BY MOUTH EVERY DAY       Cardiovascular:  Beta Blockers Passed - 12/1/2020  9:41 AM        Passed - Patient is at least 18 years old        Passed - Last BP in normal range within 360 days.     BP Readings from Last 3 Encounters:   09/21/20 136/84   10/08/19 124/86   04/08/19 124/72              Passed - Last Heart Rate in normal range within 360 days.     Pulse Readings from Last 3 Encounters:   09/21/20 81   10/08/19 80   04/08/19 74             Passed - Office visit in past 12 months or future 90 days     Recent Outpatient Visits            2 months ago Essential hypertension    [facility] NAVA López MD    1 year ago  Essential hypertension    [facility] NAVA López MD    1 year ago Essential hypertension    [facility] NAVA López MD    1 year ago CAD in native artery - occluded RCA with collaterals; mild non-obstructive Dz in LAD, LCX - cath 2007    [facility] Cardiology Claude Jacobs MD    2 years ago Nocturia    [facility] Urology Srinath Ambriz MD          Future Appointments              In 3 months NAVA López MD [facility]                    Appointments  past 12m or future 3m with PCP    Date Provider   Last Visit   9/21/2020 NAVA López MD   Next Visit   3/22/2021 NAVA López MD   ED visits in past 90 days: 0     Note composed:9:44 AM 12/01/2020

## 2020-12-01 NOTE — TELEPHONE ENCOUNTER
----- Message from Milagros Louis sent at 12/1/2020  9:32 AM CST -----  Type: Needs Medical Advice  Who Called: Patient  Pharmacy name and phone #:  [facility]  Best Call Back Number: [phone] (home)   Additional Information: the pt needs refills on Metoprolol, Pravastatin and Lisinopril refilled today. Thanks.

## 2021-03-17 ENCOUNTER — TELEPHONE (OUTPATIENT)
Dept: FAMILY MEDICINE | Facility: CLINIC | Age: 71
End: 2021-03-17

## 2021-03-19 ENCOUNTER — TELEPHONE (OUTPATIENT)
Dept: FAMILY MEDICINE | Facility: CLINIC | Age: 71
End: 2021-03-19

## 2021-05-24 ENCOUNTER — OFFICE VISIT (OUTPATIENT)
Dept: FAMILY MEDICINE | Facility: CLINIC | Age: 71
End: 2021-05-24
Payer: MEDICARE

## 2021-05-24 ENCOUNTER — LAB VISIT (OUTPATIENT)
Dept: LAB | Facility: HOSPITAL | Age: 71
End: 2021-05-24
Attending: FAMILY MEDICINE
Payer: MEDICARE

## 2021-05-24 VITALS
OXYGEN SATURATION: 98 % | TEMPERATURE: 98 F | RESPIRATION RATE: 18 BRPM | BODY MASS INDEX: 40.04 KG/M2 | HEART RATE: 78 BPM | DIASTOLIC BLOOD PRESSURE: 78 MMHG | SYSTOLIC BLOOD PRESSURE: 136 MMHG | WEIGHT: 295.19 LBS

## 2021-05-24 DIAGNOSIS — E78.5 DYSLIPIDEMIA: ICD-10-CM

## 2021-05-24 DIAGNOSIS — Z86.010 HISTORY OF COLON POLYPS: ICD-10-CM

## 2021-05-24 DIAGNOSIS — M51.36 DDD (DEGENERATIVE DISC DISEASE), LUMBAR: Primary | ICD-10-CM

## 2021-05-24 DIAGNOSIS — R73.03 PREDIABETES: ICD-10-CM

## 2021-05-24 DIAGNOSIS — I10 ESSENTIAL HYPERTENSION: ICD-10-CM

## 2021-05-24 LAB
BASOPHILS # BLD AUTO: 0.08 K/UL (ref 0–0.2)
BASOPHILS NFR BLD: 0.8 % (ref 0–1.9)
DIFFERENTIAL METHOD: ABNORMAL
EOSINOPHIL # BLD AUTO: 0.7 K/UL (ref 0–0.5)
EOSINOPHIL NFR BLD: 7.1 % (ref 0–8)
ERYTHROCYTE [DISTWIDTH] IN BLOOD BY AUTOMATED COUNT: 13 % (ref 11.5–14.5)
ESTIMATED AVG GLUCOSE: 126 MG/DL (ref 68–131)
HBA1C MFR BLD: 6 % (ref 4–5.6)
HCT VFR BLD AUTO: 47.5 % (ref 40–54)
HGB BLD-MCNC: 15.1 G/DL (ref 14–18)
IMM GRANULOCYTES # BLD AUTO: 0.03 K/UL (ref 0–0.04)
IMM GRANULOCYTES NFR BLD AUTO: 0.3 % (ref 0–0.5)
LYMPHOCYTES # BLD AUTO: 2.5 K/UL (ref 1–4.8)
LYMPHOCYTES NFR BLD: 26.5 % (ref 18–48)
MCH RBC QN AUTO: 28.8 PG (ref 27–31)
MCHC RBC AUTO-ENTMCNC: 31.8 G/DL (ref 32–36)
MCV RBC AUTO: 91 FL (ref 82–98)
MONOCYTES # BLD AUTO: 0.7 K/UL (ref 0.3–1)
MONOCYTES NFR BLD: 7.1 % (ref 4–15)
NEUTROPHILS # BLD AUTO: 5.5 K/UL (ref 1.8–7.7)
NEUTROPHILS NFR BLD: 58.2 % (ref 38–73)
NRBC BLD-RTO: 0 /100 WBC
PLATELET # BLD AUTO: 249 K/UL (ref 150–450)
PMV BLD AUTO: 11.1 FL (ref 9.2–12.9)
RBC # BLD AUTO: 5.24 M/UL (ref 4.6–6.2)
WBC # BLD AUTO: 9.42 K/UL (ref 3.9–12.7)

## 2021-05-24 PROCEDURE — 99214 PR OFFICE/OUTPT VISIT, EST, LEVL IV, 30-39 MIN: ICD-10-PCS | Mod: S$PBB,,, | Performed by: FAMILY MEDICINE

## 2021-05-24 PROCEDURE — 99999 PR PBB SHADOW E&M-EST. PATIENT-LVL III: CPT | Mod: PBBFAC,,, | Performed by: FAMILY MEDICINE

## 2021-05-24 PROCEDURE — 80061 LIPID PANEL: CPT | Performed by: FAMILY MEDICINE

## 2021-05-24 PROCEDURE — 84443 ASSAY THYROID STIM HORMONE: CPT | Performed by: FAMILY MEDICINE

## 2021-05-24 PROCEDURE — 99213 OFFICE O/P EST LOW 20 MIN: CPT | Mod: PBBFAC,PO | Performed by: FAMILY MEDICINE

## 2021-05-24 PROCEDURE — 36415 COLL VENOUS BLD VENIPUNCTURE: CPT | Mod: PO | Performed by: FAMILY MEDICINE

## 2021-05-24 PROCEDURE — 83036 HEMOGLOBIN GLYCOSYLATED A1C: CPT | Performed by: FAMILY MEDICINE

## 2021-05-24 PROCEDURE — 99214 OFFICE O/P EST MOD 30 MIN: CPT | Mod: S$PBB,,, | Performed by: FAMILY MEDICINE

## 2021-05-24 PROCEDURE — 80053 COMPREHEN METABOLIC PANEL: CPT | Performed by: FAMILY MEDICINE

## 2021-05-24 PROCEDURE — 99999 PR PBB SHADOW E&M-EST. PATIENT-LVL III: ICD-10-PCS | Mod: PBBFAC,,, | Performed by: FAMILY MEDICINE

## 2021-05-24 PROCEDURE — 85025 COMPLETE CBC W/AUTO DIFF WBC: CPT | Performed by: FAMILY MEDICINE

## 2021-05-24 RX ORDER — GABAPENTIN 600 MG/1
600 TABLET ORAL DAILY PRN
Qty: 90 TABLET | Refills: 1 | Status: SHIPPED | OUTPATIENT
Start: 2021-05-24 | End: 2022-01-31

## 2021-05-25 LAB
ALBUMIN SERPL BCP-MCNC: 4.1 G/DL (ref 3.5–5.2)
ALP SERPL-CCNC: 86 U/L (ref 55–135)
ALT SERPL W/O P-5'-P-CCNC: 23 U/L (ref 10–44)
ANION GAP SERPL CALC-SCNC: 7 MMOL/L (ref 8–16)
AST SERPL-CCNC: 23 U/L (ref 10–40)
BILIRUB SERPL-MCNC: 0.4 MG/DL (ref 0.1–1)
BUN SERPL-MCNC: 13 MG/DL (ref 8–23)
CALCIUM SERPL-MCNC: 10 MG/DL (ref 8.7–10.5)
CHLORIDE SERPL-SCNC: 104 MMOL/L (ref 95–110)
CHOLEST SERPL-MCNC: 173 MG/DL (ref 120–199)
CHOLEST/HDLC SERPL: 4 {RATIO} (ref 2–5)
CO2 SERPL-SCNC: 29 MMOL/L (ref 23–29)
CREAT SERPL-MCNC: 1 MG/DL (ref 0.5–1.4)
EST. GFR  (AFRICAN AMERICAN): >60 ML/MIN/1.73 M^2
EST. GFR  (NON AFRICAN AMERICAN): >60 ML/MIN/1.73 M^2
GLUCOSE SERPL-MCNC: 100 MG/DL (ref 70–110)
HDLC SERPL-MCNC: 43 MG/DL (ref 40–75)
HDLC SERPL: 24.9 % (ref 20–50)
LDLC SERPL CALC-MCNC: 63 MG/DL (ref 63–159)
NONHDLC SERPL-MCNC: 130 MG/DL
POTASSIUM SERPL-SCNC: 4.7 MMOL/L (ref 3.5–5.1)
PROT SERPL-MCNC: 7.6 G/DL (ref 6–8.4)
SODIUM SERPL-SCNC: 140 MMOL/L (ref 136–145)
TRIGL SERPL-MCNC: 335 MG/DL (ref 30–150)
TSH SERPL DL<=0.005 MIU/L-ACNC: 0.91 UIU/ML (ref 0.4–4)

## 2021-05-31 ENCOUNTER — TELEPHONE (OUTPATIENT)
Dept: GASTROENTEROLOGY | Facility: CLINIC | Age: 71
End: 2021-05-31

## 2021-06-22 ENCOUNTER — TELEPHONE (OUTPATIENT)
Dept: GASTROENTEROLOGY | Facility: CLINIC | Age: 71
End: 2021-06-22

## 2021-06-29 ENCOUNTER — TELEPHONE (OUTPATIENT)
Dept: GASTROENTEROLOGY | Facility: CLINIC | Age: 71
End: 2021-06-29

## 2021-12-10 ENCOUNTER — TELEPHONE (OUTPATIENT)
Dept: FAMILY MEDICINE | Facility: CLINIC | Age: 71
End: 2021-12-10
Payer: MEDICARE

## 2021-12-13 ENCOUNTER — TELEPHONE (OUTPATIENT)
Dept: FAMILY MEDICINE | Facility: CLINIC | Age: 71
End: 2021-12-13
Payer: MEDICARE

## 2021-12-14 ENCOUNTER — OFFICE VISIT (OUTPATIENT)
Dept: FAMILY MEDICINE | Facility: CLINIC | Age: 71
End: 2021-12-14
Payer: MEDICARE

## 2021-12-14 VITALS
WEIGHT: 292.56 LBS | DIASTOLIC BLOOD PRESSURE: 78 MMHG | BODY MASS INDEX: 39.62 KG/M2 | HEIGHT: 72 IN | TEMPERATURE: 98 F | OXYGEN SATURATION: 97 % | SYSTOLIC BLOOD PRESSURE: 120 MMHG | HEART RATE: 74 BPM

## 2021-12-14 DIAGNOSIS — Z01.818 PRE-OPERATIVE CLEARANCE: Primary | ICD-10-CM

## 2021-12-14 PROCEDURE — 99214 PR OFFICE/OUTPT VISIT, EST, LEVL IV, 30-39 MIN: ICD-10-PCS | Mod: S$PBB,,, | Performed by: NURSE PRACTITIONER

## 2021-12-14 PROCEDURE — 99213 OFFICE O/P EST LOW 20 MIN: CPT | Mod: PBBFAC,PO | Performed by: NURSE PRACTITIONER

## 2021-12-14 PROCEDURE — 99999 PR PBB SHADOW E&M-EST. PATIENT-LVL III: CPT | Mod: PBBFAC,,, | Performed by: NURSE PRACTITIONER

## 2021-12-14 PROCEDURE — 99999 PR PBB SHADOW E&M-EST. PATIENT-LVL III: ICD-10-PCS | Mod: PBBFAC,,, | Performed by: NURSE PRACTITIONER

## 2021-12-14 PROCEDURE — 99214 OFFICE O/P EST MOD 30 MIN: CPT | Mod: S$PBB,,, | Performed by: NURSE PRACTITIONER

## 2022-01-29 DIAGNOSIS — I10 ESSENTIAL HYPERTENSION: ICD-10-CM

## 2022-01-29 DIAGNOSIS — I25.10 ATHEROSCLEROSIS OF NATIVE CORONARY ARTERY WITHOUT ANGINA PECTORIS, UNSPECIFIED WHETHER NATIVE OR TRANSPLANTED HEART: ICD-10-CM

## 2022-01-29 RX ORDER — LISINOPRIL 20 MG/1
TABLET ORAL
Qty: 90 TABLET | Refills: 1 | Status: SHIPPED | OUTPATIENT
Start: 2022-01-29 | End: 2022-08-19

## 2022-01-29 RX ORDER — METOPROLOL SUCCINATE 25 MG/1
TABLET, EXTENDED RELEASE ORAL
Qty: 90 TABLET | Refills: 1 | Status: SHIPPED | OUTPATIENT
Start: 2022-01-29 | End: 2022-08-19

## 2022-01-29 RX ORDER — PRAVASTATIN SODIUM 40 MG/1
TABLET ORAL
Qty: 90 TABLET | Refills: 1 | Status: SHIPPED | OUTPATIENT
Start: 2022-01-29 | End: 2022-08-19

## 2022-01-29 NOTE — TELEPHONE ENCOUNTER
No new care gaps identified.  Powered by [employer]. Reference number: [id number].   1/29/2022 4:08:09 AM CST

## 2022-01-29 NOTE — TELEPHONE ENCOUNTER
Refill Routing Note   Medication(s) are not appropriate for processing by [facility] for the following reason(s):      - Outside of protocol    OR action(s):  Route  Approve       Medication Therapy Plan: Gabapentin is outside of [facility]C protocol  --->Care Gap information included in message below if applicable.   Medication reconciliation completed: No   Automatic Epic Generated Protocol Data:    Orders Placed This Encounter    metoprolol succinate (TOPROL-XL) 25 MG 24 hr tablet    lisinopriL (PRINIVIL,ZESTRIL) 20 MG tablet    pravastatin (PRAVACHOL) 40 MG tablet      Requested Prescriptions   Pending Prescriptions Disp Refills    gabapentin (NEURONTIN) 600 MG tablet [Pharmacy Med Name: GABAPENTIN 600MG TABLETS] 90 tablet 1     Sig: TAKE 1 TABLET(600 MG) BY MOUTH DAILY AS NEEDED FOR BACK PAIN       Neurology: Anticonvulsants - gabapentin Failed - 1/29/2022  4:08 AM        Failed - This refill cannot be delegated        Passed - Valid encounter within last 12 months     Recent Visits  Date Type Provider Dept   05/24/21 Office Visit NAVA López MD [facility] Family Medicine   09/21/20 Office Visit NAVA López MD [facility] Family Medicine   Showing recent visits within past 720 days and meeting all other requirements  Future Appointments  No visits were found meeting these conditions.  Showing future appointments within next 150 days and meeting all other requirements      Future Appointments              In 1 month [facility]                Passed - Cr within 360 days     Lab Results   Component Value Date    CREATININE 1.0 05/24/2021    CREATININE 1.0 09/21/2020    CREATININE 0.9 10/08/2019              Passed - eGFR within 360 days     Lab Results   Component Value Date    EGFRNONAA >60.0 05/24/2021    EGFRNONAA >60.0 09/21/2020    EGFRNONAA >60.0 10/08/2019                 Signed Prescriptions Disp Refills    metoprolol succinate (TOPROL-XL) 25 MG  24 hr tablet 90 tablet 1     Sig: TAKE 1 TABLET(25 MG) BY MOUTH EVERY DAY       Cardiovascular:  Beta Blockers Passed - 1/29/2022  4:08 AM        Passed - Patient is at least 18 years old        Passed - Last BP in normal range within 360 days     BP Readings from Last 1 Encounters:   12/14/21 120/78               Passed - Last Heart Rate in normal range within 360 days     Pulse Readings from Last 1 Encounters:   12/14/21 74              Passed - Valid encounter within last 15 months     Recent Visits  Date Type Provider Dept   05/24/21 Office Visit NAVA López MD [facility] Family Medicine   09/21/20 Office Visit NAVA López MD [facility]   Showing recent visits within past 720 days and meeting all other requirements  Future Appointments  No visits were found meeting these conditions.  Showing future appointments within next 150 days and meeting all other requirements      Future Appointments              In 1 month NAVA López MD [facility]                  lisinopriL (PRINIVIL,ZESTRIL) 20 MG tablet 90 tablet 1     Sig: TAKE 1 TABLET(20 MG) BY MOUTH EVERY DAY       Cardiovascular:  ACE Inhibitors Passed - 1/29/2022  4:08 AM        Passed - Patient is at least 18 years old        Passed - Last BP in normal range within 360 days     BP Readings from Last 1 Encounters:   12/14/21 120/78               Passed - Valid encounter within last 15 months     Recent Visits  Date Type Provider Dept   05/24/21 Office Visit NAVA López MD [facility] Family Medicine   09/21/20 Office Visit NAVA López MD [facility]   Showing recent visits within past 720 days and meeting all other requirements  Future Appointments  No visits were found meeting these conditions.  Showing future appointments within next 150 days and meeting all other requirements      Future Appointments              In 1 month NAVA López MD [facility]                 Passed - Cr is 1.39 or below and within 360 days     Lab Results   Component Value Date    CREATININE 1.0 05/24/2021    CREATININE 1.0 09/21/2020    CREATININE 0.9 10/08/2019              Passed - K is 5.2 or below and within 360 days     Potassium   Date Value Ref Range Status   05/24/2021 4.7 3.5 - 5.1 mmol/L Final   09/21/2020 4.3 3.5 - 5.1 mmol/L Final   10/08/2019 4.3 3.5 - 5.1 mmol/L Final              Passed - eGFR within 360 days     Lab Results   Component Value Date    EGFRNONAA >60.0 05/24/2021    EGFRNONAA >60.0 09/21/2020    EGFRNONAA >60.0 10/08/2019                  pravastatin (PRAVACHOL) 40 MG tablet 90 tablet 1     Sig: TAKE 1 TABLET BY MOUTH EVERY DAY       Cardiovascular:  Antilipid - Statins Passed - 1/29/2022  4:08 AM        Passed - Patient is at least 18 years old        Passed - Valid encounter within last 15 months     Recent Visits  Date Type Provider Dept   05/24/21 Office Visit NAVA López MD [facility] Family Medicine   09/21/20 Office Visit NAVA López MD [facility] Medicine   Showing recent visits within past 720 days and meeting all other requirements  Future Appointments  No visits were found meeting these conditions.  Showing future appointments within next 150 days and meeting all other requirements      Future Appointments              In 1 month NAVA López MD [facility]                Passed - ALT is 131 or below and within 360 days     ALT   Date Value Ref Range Status   05/24/2021 23 10 - 44 U/L Final   09/21/2020 37 10 - 44 U/L Final   10/08/2019 30 10 - 44 U/L Final              Passed - AST is 119 or below and within 360 days     AST   Date Value Ref Range Status   05/24/2021 23 10 - 40 U/L Final   09/21/2020 26 10 - 40 U/L Final   10/08/2019 27 10 - 40 U/L Final              Passed - Total Cholesterol within 360 days     Lab Results   Component Value Date    CHOL 173 05/24/2021    CHOL 164 09/21/2020    CHOL  208 (H) 10/08/2019              Passed - LDL within 360 days     LDL Cholesterol   Date Value Ref Range Status   05/24/2021 63.0 63.0 - 159.0 mg/dL Final     Comment:     The National Cholesterol Education Program (NCEP) has set the  following guidelines (reference values) for LDL Cholesterol:  Optimal.......................<130 mg/dL  Borderline High...............130-159 mg/dL  High..........................160-189 mg/dL  Very High.....................>190 mg/dL              Passed - HDL within 360 days     HDL   Date Value Ref Range Status   05/24/2021 43 40 - 75 mg/dL Final     Comment:     The National Cholesterol Education Program (NCEP) has set the  following guidelines (reference values) for HDL Cholesterol:  Low...............<40 mg/dL  Optimal...........>60 mg/dL              Passed - Triglycerides within 360 days     Lab Results   Component Value Date    TRIG 335 (H) 05/24/2021    TRIG 307 (H) 09/21/2020    TRIG 369 (H) 10/08/2019                    Appointments  past 12m or future 3m with PCP    Date Provider   Last Visit   5/24/2021 NAVA López MD   Next Visit   3/8/2022 NAVA López MD   ED visits in past 90 days: 0        Note composed:5:02 PM 01/29/2022

## 2022-01-31 RX ORDER — GABAPENTIN 600 MG/1
TABLET ORAL
Qty: 90 TABLET | Refills: 1 | Status: SHIPPED | OUTPATIENT
Start: 2022-01-31 | End: 2022-08-23

## 2022-02-10 ENCOUNTER — TELEPHONE (OUTPATIENT)
Dept: FAMILY MEDICINE | Facility: CLINIC | Age: 72
End: 2022-02-10
Payer: MEDICARE

## 2022-04-14 ENCOUNTER — OFFICE VISIT (OUTPATIENT)
Dept: FAMILY MEDICINE | Facility: CLINIC | Age: 72
End: 2022-04-14
Payer: MEDICARE

## 2022-04-14 ENCOUNTER — LAB VISIT (OUTPATIENT)
Dept: LAB | Facility: HOSPITAL | Age: 72
End: 2022-04-14
Attending: FAMILY MEDICINE
Payer: MEDICARE

## 2022-04-14 VITALS
OXYGEN SATURATION: 96 % | HEART RATE: 70 BPM | BODY MASS INDEX: 40.25 KG/M2 | WEIGHT: 297.19 LBS | HEIGHT: 72 IN | DIASTOLIC BLOOD PRESSURE: 66 MMHG | SYSTOLIC BLOOD PRESSURE: 104 MMHG

## 2022-04-14 DIAGNOSIS — Z12.5 SCREENING PSA (PROSTATE SPECIFIC ANTIGEN): ICD-10-CM

## 2022-04-14 DIAGNOSIS — I10 PRIMARY HYPERTENSION: ICD-10-CM

## 2022-04-14 DIAGNOSIS — M25.50 ARTHRALGIA, UNSPECIFIED JOINT: ICD-10-CM

## 2022-04-14 DIAGNOSIS — E78.5 DYSLIPIDEMIA: ICD-10-CM

## 2022-04-14 DIAGNOSIS — I10 PRIMARY HYPERTENSION: Primary | ICD-10-CM

## 2022-04-14 DIAGNOSIS — E66.01 MORBID OBESITY: ICD-10-CM

## 2022-04-14 DIAGNOSIS — R73.03 PREDIABETES: ICD-10-CM

## 2022-04-14 LAB
ALBUMIN SERPL BCP-MCNC: 3.9 G/DL (ref 3.5–5.2)
ALP SERPL-CCNC: 82 U/L (ref 55–135)
ALT SERPL W/O P-5'-P-CCNC: 21 U/L (ref 10–44)
ANION GAP SERPL CALC-SCNC: 7 MMOL/L (ref 8–16)
AST SERPL-CCNC: 19 U/L (ref 10–40)
BASOPHILS # BLD AUTO: 0.07 K/UL (ref 0–0.2)
BASOPHILS NFR BLD: 0.8 % (ref 0–1.9)
BILIRUB SERPL-MCNC: 0.5 MG/DL (ref 0.1–1)
BUN SERPL-MCNC: 12 MG/DL (ref 8–23)
CALCIUM SERPL-MCNC: 9.8 MG/DL (ref 8.7–10.5)
CHLORIDE SERPL-SCNC: 103 MMOL/L (ref 95–110)
CHOLEST SERPL-MCNC: 147 MG/DL (ref 120–199)
CHOLEST/HDLC SERPL: 3.8 {RATIO} (ref 2–5)
CO2 SERPL-SCNC: 30 MMOL/L (ref 23–29)
COMPLEXED PSA SERPL-MCNC: 0.42 NG/ML (ref 0–4)
CREAT SERPL-MCNC: 1 MG/DL (ref 0.5–1.4)
DIFFERENTIAL METHOD: NORMAL
EOSINOPHIL # BLD AUTO: 0.2 K/UL (ref 0–0.5)
EOSINOPHIL NFR BLD: 2.5 % (ref 0–8)
ERYTHROCYTE [DISTWIDTH] IN BLOOD BY AUTOMATED COUNT: 12.7 % (ref 11.5–14.5)
EST. GFR  (AFRICAN AMERICAN): >60 ML/MIN/1.73 M^2
EST. GFR  (NON AFRICAN AMERICAN): >60 ML/MIN/1.73 M^2
ESTIMATED AVG GLUCOSE: 131 MG/DL (ref 68–131)
GLUCOSE SERPL-MCNC: 120 MG/DL (ref 70–110)
HBA1C MFR BLD: 6.2 % (ref 4–5.6)
HCT VFR BLD AUTO: 46.1 % (ref 40–54)
HDLC SERPL-MCNC: 39 MG/DL (ref 40–75)
HDLC SERPL: 26.5 % (ref 20–50)
HGB BLD-MCNC: 14.9 G/DL (ref 14–18)
IMM GRANULOCYTES # BLD AUTO: 0.03 K/UL (ref 0–0.04)
IMM GRANULOCYTES NFR BLD AUTO: 0.3 % (ref 0–0.5)
LDLC SERPL CALC-MCNC: 52.2 MG/DL (ref 63–159)
LYMPHOCYTES # BLD AUTO: 2 K/UL (ref 1–4.8)
LYMPHOCYTES NFR BLD: 21.8 % (ref 18–48)
MCH RBC QN AUTO: 28.8 PG (ref 27–31)
MCHC RBC AUTO-ENTMCNC: 32.3 G/DL (ref 32–36)
MCV RBC AUTO: 89 FL (ref 82–98)
MONOCYTES # BLD AUTO: 0.6 K/UL (ref 0.3–1)
MONOCYTES NFR BLD: 6.5 % (ref 4–15)
NEUTROPHILS # BLD AUTO: 6.3 K/UL (ref 1.8–7.7)
NEUTROPHILS NFR BLD: 68.1 % (ref 38–73)
NONHDLC SERPL-MCNC: 108 MG/DL
NRBC BLD-RTO: 0 /100 WBC
PLATELET # BLD AUTO: 249 K/UL (ref 150–450)
PMV BLD AUTO: 10.6 FL (ref 9.2–12.9)
POTASSIUM SERPL-SCNC: 4.8 MMOL/L (ref 3.5–5.1)
PROT SERPL-MCNC: 7.3 G/DL (ref 6–8.4)
RBC # BLD AUTO: 5.17 M/UL (ref 4.6–6.2)
SODIUM SERPL-SCNC: 140 MMOL/L (ref 136–145)
TRIGL SERPL-MCNC: 279 MG/DL (ref 30–150)
TSH SERPL DL<=0.005 MIU/L-ACNC: 1.02 UIU/ML (ref 0.4–4)
URATE SERPL-MCNC: 6.4 MG/DL (ref 3.4–7)
WBC # BLD AUTO: 9.23 K/UL (ref 3.9–12.7)

## 2022-04-14 PROCEDURE — 99214 OFFICE O/P EST MOD 30 MIN: CPT | Mod: S$PBB,,, | Performed by: FAMILY MEDICINE

## 2022-04-14 PROCEDURE — 80061 LIPID PANEL: CPT | Performed by: FAMILY MEDICINE

## 2022-04-14 PROCEDURE — 84550 ASSAY OF BLOOD/URIC ACID: CPT | Performed by: FAMILY MEDICINE

## 2022-04-14 PROCEDURE — 36415 COLL VENOUS BLD VENIPUNCTURE: CPT | Mod: PO | Performed by: FAMILY MEDICINE

## 2022-04-14 PROCEDURE — 99214 PR OFFICE/OUTPT VISIT, EST, LEVL IV, 30-39 MIN: ICD-10-PCS | Mod: S$PBB,,, | Performed by: FAMILY MEDICINE

## 2022-04-14 PROCEDURE — 83036 HEMOGLOBIN GLYCOSYLATED A1C: CPT | Performed by: FAMILY MEDICINE

## 2022-04-14 PROCEDURE — 84153 ASSAY OF PSA TOTAL: CPT | Performed by: FAMILY MEDICINE

## 2022-04-14 PROCEDURE — 85025 COMPLETE CBC W/AUTO DIFF WBC: CPT | Performed by: FAMILY MEDICINE

## 2022-04-14 PROCEDURE — 99999 PR PBB SHADOW E&M-EST. PATIENT-LVL III: CPT | Mod: PBBFAC,,, | Performed by: FAMILY MEDICINE

## 2022-04-14 PROCEDURE — 80053 COMPREHEN METABOLIC PANEL: CPT | Performed by: FAMILY MEDICINE

## 2022-04-14 PROCEDURE — 84443 ASSAY THYROID STIM HORMONE: CPT | Performed by: FAMILY MEDICINE

## 2022-04-14 PROCEDURE — 99213 OFFICE O/P EST LOW 20 MIN: CPT | Mod: PBBFAC,PO | Performed by: FAMILY MEDICINE

## 2022-04-14 PROCEDURE — 99999 PR PBB SHADOW E&M-EST. PATIENT-LVL III: ICD-10-PCS | Mod: PBBFAC,,, | Performed by: FAMILY MEDICINE

## 2022-04-14 NOTE — PROGRESS NOTES
Subjective:       Patient ID: Pj Malone is a 71 y.o. male.    Chief Complaint: Follow-up    Here for f/u htn and lipids. Doing well overall. Some joint pains at times.    Hyperlipidemia  This is a chronic problem. The current episode started more than 1 year ago. The problem is controlled. Pertinent negatives include no chest pain or shortness of breath.   Hypertension  This is a chronic problem. The current episode started more than 1 year ago. The problem is controlled. Pertinent negatives include no chest pain, palpitations or shortness of breath.     Review of Systems   Constitutional: Negative for chills and fever.   Respiratory: Negative for cough, chest tightness and shortness of breath.    Cardiovascular: Negative for chest pain, palpitations and leg swelling.   Endocrine: Negative for cold intolerance and heat intolerance.   Psychiatric/Behavioral: Negative for decreased concentration. The patient is not nervous/anxious.        Objective:      Physical Exam  Vitals and nursing note reviewed.   Constitutional:       Appearance: He is well-developed.   HENT:      Head: Normocephalic and atraumatic.   Cardiovascular:      Rate and Rhythm: Normal rate and regular rhythm.      Heart sounds: Normal heart sounds.   Pulmonary:      Effort: Pulmonary effort is normal.      Breath sounds: Normal breath sounds.         Assessment:       1. Primary hypertension    2. Dyslipidemia    3. Screening PSA (prostate specific antigen)    4. Arthralgia, unspecified joint    5. Prediabetes    6. Morbid obesity        Plan:       Primary hypertension  -     CBC Auto Differential; Future; Expected date: 04/14/2022  -     Comprehensive Metabolic Panel; Future; Expected date: 04/14/2022  -     Lipid Panel; Future; Expected date: 04/14/2022  -     TSH; Future; Expected date: 04/14/2022    Dyslipidemia  -     CBC Auto Differential; Future; Expected date: 04/14/2022  -     Comprehensive Metabolic Panel; Future; Expected date:  04/14/2022  -     Lipid Panel; Future; Expected date: 04/14/2022  -     TSH; Future; Expected date: 04/14/2022    Screening PSA (prostate specific antigen)  -     PSA, Screening; Future; Expected date: 04/14/2022    Arthralgia, unspecified joint  -     Uric Acid; Future; Expected date: 04/14/2022    Prediabetes  -     Hemoglobin A1C; Future; Expected date: 04/14/2022    Morbid obesity        Update labs and health maintenance  htn stable  Lipid stable  Cad stable and asymptomatic  Will monitor chronic medical issues and continue current plan of care.  Diet/exercise and wt loss    Follow up in about 6 months (around 10/14/2022), or if symptoms worsen or fail to improve.

## 2022-05-09 NOTE — TELEPHONE ENCOUNTER
Left message to call back  
Results given  
Test(s) Reviewed  I have reviewed the following in detail:  [] Stress test   [] Angiography   [x] Echocardiogram   [] Labs   [] Other:       Call Pt and tell him tests are ok; unchanged from previous    
Detail Level: Simple

## 2022-12-05 ENCOUNTER — OFFICE VISIT (OUTPATIENT)
Dept: FAMILY MEDICINE | Facility: CLINIC | Age: 72
End: 2022-12-05
Payer: MEDICARE

## 2022-12-05 VITALS
OXYGEN SATURATION: 96 % | TEMPERATURE: 98 F | HEART RATE: 72 BPM | WEIGHT: 281.94 LBS | SYSTOLIC BLOOD PRESSURE: 108 MMHG | DIASTOLIC BLOOD PRESSURE: 72 MMHG | HEIGHT: 72 IN | BODY MASS INDEX: 38.19 KG/M2

## 2022-12-05 DIAGNOSIS — R73.9 HYPERGLYCEMIA: ICD-10-CM

## 2022-12-05 DIAGNOSIS — E78.5 DYSLIPIDEMIA: ICD-10-CM

## 2022-12-05 DIAGNOSIS — Z12.5 SCREENING PSA (PROSTATE SPECIFIC ANTIGEN): ICD-10-CM

## 2022-12-05 DIAGNOSIS — I10 PRIMARY HYPERTENSION: Primary | ICD-10-CM

## 2022-12-05 DIAGNOSIS — Z86.010 HISTORY OF COLONIC POLYPS: ICD-10-CM

## 2022-12-05 DIAGNOSIS — N32.81 OVERACTIVE BLADDER: ICD-10-CM

## 2022-12-05 PROCEDURE — 99214 PR OFFICE/OUTPT VISIT, EST, LEVL IV, 30-39 MIN: ICD-10-PCS | Mod: S$PBB,,, | Performed by: INTERNAL MEDICINE

## 2022-12-05 PROCEDURE — G0008 ADMIN INFLUENZA VIRUS VAC: HCPCS | Mod: PBBFAC,PO

## 2022-12-05 PROCEDURE — 99999 PR PBB SHADOW E&M-EST. PATIENT-LVL III: CPT | Mod: PBBFAC,,, | Performed by: INTERNAL MEDICINE

## 2022-12-05 PROCEDURE — 99213 OFFICE O/P EST LOW 20 MIN: CPT | Mod: PBBFAC,PO,25 | Performed by: INTERNAL MEDICINE

## 2022-12-05 PROCEDURE — 99214 OFFICE O/P EST MOD 30 MIN: CPT | Mod: S$PBB,,, | Performed by: INTERNAL MEDICINE

## 2022-12-05 PROCEDURE — 99999 PR PBB SHADOW E&M-EST. PATIENT-LVL III: ICD-10-PCS | Mod: PBBFAC,,, | Performed by: INTERNAL MEDICINE

## 2022-12-05 NOTE — PROGRESS NOTES
Patient ID: Pj Malone is a 71 y.o. male.    Chief Complaint: Hypertension (6 month f/u)     PMH Coronary artery disease, hypertension, hyperlipidemia, history of heart failure, prediabetes, class 2 obesity   HPI 6 mo follow up. No long seeing cardiology (nonobstructive coronary artery disease). No chest pain or dyspnea.  He is having some urinary urgency. Feels like he has to go again 20 min later. No leakage. Urine stream is pretty strong. He declines TATA. PSA normal. Lost weight intentionally. LDL ok, triglycerides elevated. Tolerating pravastatin. He does not check blood pressure at home. Tolerating lisinopril and metoprolol.      PLAN Consider mybetriq for OAB.  He declines urology referral.  UA today. Labs in April.  Home serial blood pressure monitoring.  Follow-up in April for annual   Dx and Orders Pj was seen today for hypertension.    Diagnoses and all orders for this visit:    Primary hypertension  -     Comprehensive Metabolic Panel; Future    Overactive bladder  -     Urinalysis, Reflex to Urine Culture Urine, Clean Catch; Future    Hyperglycemia  -     Hemoglobin A1C; Future    Screening PSA (prostate specific antigen)  -     PSA, Screening; Future    Dyslipidemia  -     Lipid Panel; Future    History of colonic polyps  -     Case Request Endoscopy: COLONOSCOPY    Other orders  -     Influenza - Quadrivalent (Adjuvanted)     Review of Systems   Constitutional:  Negative for fever.   Respiratory:  Negative for shortness of breath.    Cardiovascular:  Negative for chest pain.   Gastrointestinal:  Negative for abdominal pain.     Physical Exam  Cardiovascular:      Rate and Rhythm: Normal rate and regular rhythm.      Heart sounds: No murmur heard.    No gallop.   Pulmonary:      Breath sounds: Normal breath sounds. No wheezing or rhonchi.   Abdominal:      Palpations: Abdomen is soft.      Tenderness: There is no abdominal tenderness.      Hernia: A hernia is present.      Comments: Umbilical  hernia   Musculoskeletal:         General: Normal range of motion.      Cervical back: Neck supple.   Skin:     General: Skin is warm.      Findings: No rash.   Neurological:      Mental Status: He is alert.   Psychiatric:         Behavior: Behavior normal.       Vitals:    12/05/22 1322   BP: 108/72   Pulse: 72   Temp: 97.6 °F (36.4 °C)     Wt Readings from Last 3 Encounters:   12/05/22 1322 127.9 kg (281 lb 15.5 oz)   04/14/22 0809 134.8 kg (297 lb 2.9 oz)   12/14/21 1039 132.7 kg (292 lb 8.8 oz)      Body mass index is 38.24 kg/m².      Hypertension Medications               lisinopriL (PRINIVIL,ZESTRIL) 20 MG tablet TAKE 1 TABLET(20 MG) BY MOUTH EVERY DAY    metoprolol succinate (TOPROL-XL) 25 MG 24 hr tablet TAKE 1 TABLET(25 MG) BY MOUTH EVERY DAY           Hyperlipidemia Medications               pravastatin (PRAVACHOL) 40 MG tablet TAKE 1 TABLET BY MOUTH EVERY DAY           Medication List with Changes/Refills   Current Medications    ASPIRIN (ECOTRIN) 81 MG EC TABLET    Take 81 mg by mouth once daily.    GABAPENTIN (NEURONTIN) 600 MG TABLET    TAKE 1 TABLET(600 MG) BY MOUTH DAILY AS NEEDED FOR BACK PAIN    LISINOPRIL (PRINIVIL,ZESTRIL) 20 MG TABLET    TAKE 1 TABLET(20 MG) BY MOUTH EVERY DAY    METOPROLOL SUCCINATE (TOPROL-XL) 25 MG 24 HR TABLET    TAKE 1 TABLET(25 MG) BY MOUTH EVERY DAY    PRAVASTATIN (PRAVACHOL) 40 MG TABLET    TAKE 1 TABLET BY MOUTH EVERY DAY       I personally reviewed past medical, family and social history.

## 2023-01-04 DIAGNOSIS — I25.10 ATHEROSCLEROSIS OF NATIVE CORONARY ARTERY WITHOUT ANGINA PECTORIS, UNSPECIFIED WHETHER NATIVE OR TRANSPLANTED HEART: ICD-10-CM

## 2023-01-04 DIAGNOSIS — I10 ESSENTIAL HYPERTENSION: ICD-10-CM

## 2023-01-04 DIAGNOSIS — M51.36 DDD (DEGENERATIVE DISC DISEASE), LUMBAR: Primary | ICD-10-CM

## 2023-01-04 RX ORDER — LISINOPRIL 20 MG/1
20 TABLET ORAL DAILY
Qty: 90 TABLET | Refills: 2 | Status: SHIPPED | OUTPATIENT
Start: 2023-01-04 | End: 2023-04-27 | Stop reason: SDUPTHER

## 2023-01-04 RX ORDER — METOPROLOL SUCCINATE 25 MG/1
25 TABLET, EXTENDED RELEASE ORAL DAILY
Qty: 90 TABLET | Refills: 2 | Status: SHIPPED | OUTPATIENT
Start: 2023-01-04 | End: 2023-04-27 | Stop reason: SDUPTHER

## 2023-01-04 RX ORDER — PRAVASTATIN SODIUM 40 MG/1
40 TABLET ORAL DAILY
Qty: 90 TABLET | Refills: 2 | Status: SHIPPED | OUTPATIENT
Start: 2023-01-04 | End: 2023-04-27 | Stop reason: SDUPTHER

## 2023-01-04 RX ORDER — GABAPENTIN 600 MG/1
TABLET ORAL
Qty: 90 TABLET | Refills: 1 | Status: SHIPPED | OUTPATIENT
Start: 2023-01-04 | End: 2023-04-26 | Stop reason: SDUPTHER

## 2023-01-04 NOTE — TELEPHONE ENCOUNTER
No new care gaps identified.  [facility] Embedded Care Gaps. Reference number: [id number]. 1/04/2023   11:12:12 AM CST

## 2023-01-04 NOTE — TELEPHONE ENCOUNTER
----- Message from Tyrone Rosales sent at 1/4/2023 12:08 PM CST -----  Type: Needs Medical Advice  Who Called:  Patient    Pharmacy name and phone #:    [id number] - [place] - [address]  [address]  [place] [address]  Phone: [phone] Fax: [phone]      Best Call Back Number: [phone]  Additional Information: Patient states that his refills aren't the pharmacy.  Patient isn't sure of the medications names.    Please call STAT-- Pt is very upset

## 2023-01-04 NOTE — TELEPHONE ENCOUNTER
----- Message from Tyrone Rosales sent at 1/4/2023  8:24 AM CST -----  Type: Needs Medical Advice  Who Called:  Patient    Pharmacy name and phone #:    [id number] - [place] - [address] AT [address]  [address]  [place] [address]  Phone: [phone] Fax: [phone]      Best Call Back Number: [phone]  Additional Information: Patient states that his refills aren't at the pharmacy.  Patient doesn't know the names of the medications.  Please call to advise

## 2023-03-28 ENCOUNTER — TELEPHONE (OUTPATIENT)
Dept: GASTROENTEROLOGY | Facility: CLINIC | Age: 73
End: 2023-03-28
Payer: MEDICARE

## 2023-03-29 ENCOUNTER — TELEPHONE (OUTPATIENT)
Dept: GASTROENTEROLOGY | Facility: CLINIC | Age: 73
End: 2023-03-29
Payer: MEDICARE

## 2023-03-29 NOTE — TELEPHONE ENCOUNTER
----- Message from Elizabeth Koehler LPN sent at 3/29/2023  2:32 PM CDT -----  Contact: patient    ----- Message -----  From: Shaunna Samayoa  Sent: 3/29/2023  11:38 AM CDT  To: Jaspreet Roque Staff    Type:  Patient Returning Call    Who Called:  patient  Who Left Message for Patient:  camryn  Does the patient know what this is regarding?:    Best Call Back Number:  [phone] (home)   Additional Information:

## 2023-03-29 NOTE — TELEPHONE ENCOUNTER
Spoke with pt. Rescheduled procedure as provider will not be in on date scheduled. Pt verbalized understanding to new date

## 2023-03-29 NOTE — TELEPHONE ENCOUNTER
----- Message from Elizabeth Koehler LPN sent at 3/29/2023  2:33 PM CDT -----  Contact: patient    ----- Message -----  From: Shaunna Samayoa  Sent: 3/29/2023  11:38 AM CDT  To: Jaspreet Roque Staff    Type:  Patient Returning Call    Who Called:  patient  Who Left Message for Patient:  camryn  Does the patient know what this is regarding?:    Best Call Back Number:  [phone] (home)   Additional Information:

## 2023-04-17 ENCOUNTER — NURSE TRIAGE (OUTPATIENT)
Dept: ADMINISTRATIVE | Facility: CLINIC | Age: 73
End: 2023-04-17
Payer: MEDICARE

## 2023-04-17 NOTE — TELEPHONE ENCOUNTER
Patient scheduled for a colonoscopy tomorrow morning. Has several questions regarding his prep instructions. All questions and concerns were addressed.  Advised the patient to call back with any further questions or concerns.      Reason for Disposition   Question about upcoming scheduled test, no triage required and triager able to answer question    Protocols used: Information Only Call - No Triage-A-

## 2023-04-18 ENCOUNTER — HOSPITAL ENCOUNTER (OUTPATIENT)
Facility: HOSPITAL | Age: 73
Discharge: HOME | End: 2023-04-18
Attending: SURGERY | Admitting: SURGERY
Payer: MEDICARE

## 2023-04-18 ENCOUNTER — ANESTHESIA EVENT (OUTPATIENT)
Dept: ENDOSCOPY | Facility: HOSPITAL | Age: 73
End: 2023-04-18
Payer: MEDICARE

## 2023-04-18 ENCOUNTER — ANESTHESIA (OUTPATIENT)
Dept: ENDOSCOPY | Facility: HOSPITAL | Age: 73
End: 2023-04-18
Payer: MEDICARE

## 2023-04-18 VITALS
TEMPERATURE: 98 F | OXYGEN SATURATION: 95 % | HEIGHT: 72 IN | RESPIRATION RATE: 16 BRPM | HEART RATE: 73 BPM | BODY MASS INDEX: 38.06 KG/M2 | SYSTOLIC BLOOD PRESSURE: 117 MMHG | DIASTOLIC BLOOD PRESSURE: 63 MMHG | WEIGHT: 281 LBS

## 2023-04-18 DIAGNOSIS — Z86.010 HISTORY OF COLON POLYPS: ICD-10-CM

## 2023-04-18 PROCEDURE — 45385 PR COLONOSCOPY,REMV LESN,SNARE: ICD-10-PCS | Mod: PT,,, | Performed by: SURGERY

## 2023-04-18 PROCEDURE — D9220A PRA ANESTHESIA: Mod: PT,ANES,, | Performed by: ANESTHESIOLOGY

## 2023-04-18 PROCEDURE — 45385 COLONOSCOPY W/LESION REMOVAL: CPT | Mod: PT,,, | Performed by: SURGERY

## 2023-04-18 PROCEDURE — D9220A PRA ANESTHESIA: Mod: PT,CRNA,, | Performed by: NURSE ANESTHETIST, CERTIFIED REGISTERED

## 2023-04-18 PROCEDURE — 88305 TISSUE EXAM BY PATHOLOGIST: CPT | Mod: 26,,, | Performed by: PATHOLOGY

## 2023-04-18 PROCEDURE — 37000008 HC ANESTHESIA 1ST 15 MINUTES: Mod: PO | Performed by: SURGERY

## 2023-04-18 PROCEDURE — D9220A PRA ANESTHESIA: ICD-10-PCS | Mod: PT,ANES,, | Performed by: ANESTHESIOLOGY

## 2023-04-18 PROCEDURE — 63600175 PHARM REV CODE 636 W HCPCS: Mod: PO | Performed by: NURSE ANESTHETIST, CERTIFIED REGISTERED

## 2023-04-18 PROCEDURE — 88305 TISSUE EXAM BY PATHOLOGIST: CPT | Mod: 59,PO | Performed by: PATHOLOGY

## 2023-04-18 PROCEDURE — 45380 PR COLONOSCOPY,BIOPSY: ICD-10-PCS | Mod: PT,59,, | Performed by: SURGERY

## 2023-04-18 PROCEDURE — 37000009 HC ANESTHESIA EA ADD 15 MINS: Mod: PO | Performed by: SURGERY

## 2023-04-18 PROCEDURE — 63600175 PHARM REV CODE 636 W HCPCS: Mod: PO | Performed by: SURGERY

## 2023-04-18 PROCEDURE — D9220A PRA ANESTHESIA: ICD-10-PCS | Mod: PT,CRNA,, | Performed by: NURSE ANESTHETIST, CERTIFIED REGISTERED

## 2023-04-18 PROCEDURE — 45385 COLONOSCOPY W/LESION REMOVAL: CPT | Mod: PT,PO | Performed by: SURGERY

## 2023-04-18 PROCEDURE — 45380 COLONOSCOPY AND BIOPSY: CPT | Mod: PT,PO | Performed by: SURGERY

## 2023-04-18 PROCEDURE — 45380 COLONOSCOPY AND BIOPSY: CPT | Mod: PT,59,, | Performed by: SURGERY

## 2023-04-18 PROCEDURE — 88305 TISSUE EXAM BY PATHOLOGIST: ICD-10-PCS | Mod: 26,,, | Performed by: PATHOLOGY

## 2023-04-18 PROCEDURE — 27201089 HC SNARE, DISP (ANY): Mod: PO | Performed by: SURGERY

## 2023-04-18 PROCEDURE — 27201012 HC FORCEPS, HOT/COLD, DISP: Mod: PO | Performed by: SURGERY

## 2023-04-18 RX ORDER — SODIUM CHLORIDE, SODIUM LACTATE, POTASSIUM CHLORIDE, CALCIUM CHLORIDE 600; 310; 30; 20 MG/100ML; MG/100ML; MG/100ML; MG/100ML
INJECTION, SOLUTION INTRAVENOUS CONTINUOUS
Status: DISCONTINUED | OUTPATIENT
Start: 2023-04-18 | End: 2023-04-18 | Stop reason: HOSPADM

## 2023-04-18 RX ORDER — PROPOFOL 10 MG/ML
VIAL (ML) INTRAVENOUS
Status: DISCONTINUED | OUTPATIENT
Start: 2023-04-18 | End: 2023-04-18

## 2023-04-18 RX ADMIN — PROPOFOL 30 MG: 10 INJECTION, EMULSION INTRAVENOUS at 09:04

## 2023-04-18 RX ADMIN — SODIUM CHLORIDE, POTASSIUM CHLORIDE, SODIUM LACTATE AND CALCIUM CHLORIDE: 600; 310; 30; 20 INJECTION, SOLUTION INTRAVENOUS at 08:04

## 2023-04-18 RX ADMIN — PROPOFOL 130 MG: 10 INJECTION, EMULSION INTRAVENOUS at 09:04

## 2023-04-18 NOTE — TRANSFER OF CARE
Anesthesia Transfer of Care Note    Patient: Pj Malone    Procedure(s) Performed: Procedure(s) (LRB):  COLONOSCOPY (N/A)    Patient location: PACU    Anesthesia Type: general    Transport from OR: Transported from OR on 2-3 L/min O2 by NC with adequate spontaneous ventilation    Post pain: adequate analgesia    Post assessment: no apparent anesthetic complications and tolerated procedure well    Post vital signs: stable    Level of consciousness: awake    Nausea/Vomiting: no nausea/vomiting    Complications: none    Transfer of care protocol was followed      Last vitals:   Visit Vitals  BP (!) 151/89 (BP Location: Right arm, Patient Position: Lying)   Pulse 80   Temp 37 °C (98.6 °F) (Skin)   Resp 18   Ht 6' (1.829 m)   Wt 127.5 kg (281 lb)   SpO2 96%   BMI 38.11 kg/m²

## 2023-04-18 NOTE — PROVATION PATIENT INSTRUCTIONS
Discharge Summary/Instructions after an Endoscopic Procedure  Patient Name: Pj Malone  Patient MRN: [id number]  Patient YOB: 1950 Tuesday, April 18, 2023  Salvatore Gates MD  Dear patient,  As a result of recent federal legislation (The Federal Cures Act), you may   receive lab or pathology results from your procedure in your [facility]   account before your physician is able to contact you. Your physician or   their representative will relay the results to you with their   recommendations at their soonest availability.  Thank you,  RESTRICTIONS:  During your procedure today, you received medications for sedation.  These   medications may affect your judgment, balance and coordination.  Therefore,   for 24 hours, you have the following restrictions:   - DO NOT drive a car, operate machinery, make legal/financial decisions,   sign important papers or drink alcohol.    ACTIVITY:  Today: no heavy lifting, straining or running due to procedural   sedation/anesthesia.  The following day: return to full activity including work.  DIET:  Eat and drink normally unless instructed otherwise.     TREATMENT FOR COMMON SIDE EFFECTS:  - Mild abdominal pain, nausea, belching, bloating or excessive gas:  rest,   eat lightly and use a heating pad.  - Sore Throat: treat with throat lozenges and/or gargle with warm salt   water.  - Because air was used during the procedure, expelling large amounts of air   from your rectum or belching is normal.  - If a bowel prep was taken, you may not have a bowel movement for 1-3 days.    This is normal.  SYMPTOMS TO WATCH FOR AND REPORT TO YOUR PHYSICIAN:  1. Abdominal pain or bloating, other than gas cramps.  2. Chest pain.  3. Back pain.  4. Signs of infection such as: chills or fever occurring within 24 hours   after the procedure.  5. Rectal bleeding, which would show as bright red, maroon, or black stools.   (A tablespoon of blood from the rectum is not serious, especially if    hemorrhoids are present.)  6. Vomiting.  7. Weakness or dizziness.  GO DIRECTLY TO THE NEAREST EMERGENCY ROOM IF YOU HAVE ANY OF THE FOLLOWING:      Difficulty breathing              Chills and/or fever over 101 F   Persistent vomiting and/or vomiting blood   Severe abdominal pain   Severe chest pain   Black, tarry stools   Bleeding- more than one tablespoon   Any other symptom or condition that you feel may need urgent attention  Your doctor recommends these additional instructions:  If any biopsies were taken, your doctors clinic will contact you in 1 to 2   weeks with any results.  You are being discharged to home.   Resume your regular diet.   Continue your present medications.   We are waiting for your pathology results.   Your physician has recommended a repeat colonoscopy in five years for   surveillance.   Return to my office as needed.   Your physician has recommended a repeat colonoscopy in three years for   surveillance.  For questions, problems or results please call your physician - Salvatore Gates MD at Work:  [phone].  EMERGENCY PHONE NUMBER: [phone], LAB RESULTS: [phone]  IF A COMPLICATION OR EMERGENCY SITUATION ARISES AND YOU ARE UNABLE TO REACH   YOUR PHYSICIAN - GO DIRECTLY TO THE EMERGENCY ROOM.  ___________________________________________  Nurse Signature  ___________________________________________  Patient/Designated Responsible Party Signature  Salvatore Gates MD  4/18/2023 9:53:16 AM  This report has been verified and signed electronically.  Dear patient,  As a result of recent federal legislation (The Federal Cures Act), you may   receive lab or pathology results from your procedure in your [facility]   account before your physician is able to contact you. Your physician or   their representative will relay the results to you with their   recommendations at their soonest availability.  Thank you.  PROVATION

## 2023-04-18 NOTE — H&P
[place] - Endoscopy  History & Physical     Subjective:     Chief Complaint/Reason for Admission: surveillance cscope    History of Present Illness:   Patient 72 y.o. male presents for surveillance cscope.  Pt notes having had multiple polyps removed in 2017 at last cscope.  Denies abd pain.  No changes in bowel habits.  No blood per rectum.  Pt with PMHx significant for obesity, HTN, CAD.  NO significant PShx.   .    Patient Active Problem List    Diagnosis Date Noted    Overactive bladder 12/05/2022    Morbid obesity 09/21/2020    DDD (degenerative disc disease), lumbar 10/29/2015    CAD in native artery - occluded RCA with collaterals; mild non-obstructive Dz in LAD, LCX - cath 2007 02/28/2013    Cardiomyopathy, ischemic - EF 35-40% ---> 55% echo 03/2013 02/28/2013    Hypertension 02/28/2013    Dyslipidemia 02/28/2013        Medications Prior to Admission   Medication Sig Dispense Refill Last Dose    aspirin (ECOTRIN) 81 MG EC tablet Take 81 mg by mouth once daily.   4/13/2023    gabapentin (NEURONTIN) 600 MG tablet TAKE 1 TABLET(600 MG) BY MOUTH DAILY AS NEEDED FOR BACK PAIN 90 tablet 1 4/13/2023    lisinopriL (PRINIVIL,ZESTRIL) 20 MG tablet Take 1 tablet (20 mg total) by mouth once daily. 90 tablet 2 4/13/2023    metoprolol succinate (TOPROL-XL) 25 MG 24 hr tablet Take 1 tablet (25 mg total) by mouth once daily. 90 tablet 2 4/13/2023    pravastatin (PRAVACHOL) 40 MG tablet Take 1 tablet (40 mg total) by mouth once daily. 90 tablet 2 4/13/2023     Review of patient's allergies indicates:   Allergen Reactions    Poison ivy [vit e-nonoxynol 9-aloe vera] Anaphylaxis        Past Medical History:   Diagnosis Date    Coronary artery disease     Former smoker     Hyperlipidemia     Hypertension     Obesity     bmi 39      Past Surgical History:   Procedure Laterality Date    CARDIAC CATHETERIZATION      CIRCUMCISION      COLONOSCOPY N/A 5/25/2017    Procedure: COLONOSCOPY;  Surgeon: Viraj Peters MD;  Location:  [facility] END[facility];  Service: Endoscopy;  Laterality: N/A;    JOINT REPLACEMENT Left 2007    left knee    TONSILLECTOMY        Family History   Problem Relation Age of Onset    Heart disease Mother     Hypertension Mother     Cancer Father         neck    Heart disease Father     Heart disease Sister     Mental illness Son     No Known Problems Sister     No Known Problems Sister     Colon polyps Neg Hx     Colon cancer Neg Hx     Crohn's disease Neg Hx     Ulcerative colitis Neg Hx     Esophageal cancer Neg Hx     Stomach cancer Neg Hx       Social History     Tobacco Use    Smoking status: Former     Packs/day: 0.50     Years: 6.00     Pack years: 3.00     Types: Cigarettes    Smokeless tobacco: Never   Substance Use Topics    Alcohol use: Yes     Comment: occas- 12pack/month        Review of Systems:  A comprehensive review of systems was negative.  Does note nocturia only  OBJECTIVE:     No data found.  AAOx3  No resp distress  Sinus  Soft/obese     Data Review:      ASSESSMENT/PLAN:     There are no hospital problems to display for this patient.  History of colon polyps    Plan:  TO have cscope today

## 2023-04-21 LAB
FINAL PATHOLOGIC DIAGNOSIS: NORMAL
GROSS: NORMAL
Lab: NORMAL

## 2023-04-22 NOTE — ANESTHESIA PREPROCEDURE EVALUATION
04/22/2023  Pj Malone is a 72 y.o., male.    Pre-op Assessment       I have reviewed the Medications.     Review of Systems  Anesthesia Hx:  No problems with previous Anesthesia   Social:  Non-Smoker, No Alcohol Use    Cardiovascular:   Hypertension CAD   hyperlipidemia    Pulmonary:  Pulmonary Normal    Renal/:  Renal/ Normal     Hepatic/GI:  Hepatic/GI Normal    Musculoskeletal:   Arthritis   Spine Disorders: lumbar Disc disease and Degenerative disease    Neurological:  Neurology Normal    Endocrine:  Endocrine Normal        Physical Exam  General:  Obesity      Airway/Jaw/Neck:  Airway Findings: Mouth Opening: Normal   General Airway Assessment: Adult Mallampati: II  Jaw/Neck Findings:  Neck ROM: Extension Decreased, Mild        Chest/Lungs:  Chest/Lungs Findings: Clear to auscultation, Normal Respiratory Rate      Heart/Vascular:  Heart Findings: Rate: Normal  Rhythm: Regular Rhythm  Sounds: Normal  Heart murmur: negative Vascular Findings: Normal (No carotid bruits.)       Mental Status:  Mental Status Findings:  Cooperative, Alert and Oriented         Anesthesia Plan  Type of Anesthesia, risks & benefits discussed:  Anesthesia Type:  Gen Natural Airway    Patient's Preference:   Plan Factors:          Intra-op Monitoring Plan: Standard ASA Monitors  Intra-op Monitoring Plan Comments:   Post Op Pain Control Plan:   Post Op Pain Control Plan Comments:     Induction:   IV  Beta Blocker:  Patient is on a Beta-Blocker and has received one dose within the past 24 hours (No further documentation required).       Informed Consent: Informed consent signed with the Patient and all parties understand the risks and agree with anesthesia plan.  All questions answered.  Anesthesia consent signed with patient.  ASA Score: 3     Day of Surgery Review of History & Physical:    H&P Update referred to the  surgeon/provider.          Ready For Surgery From Anesthesia Perspective.           Physical Exam  General: Obesity    Airway:  Mallampati: II   Mouth Opening: Normal  Neck ROM: Extension Decreased, Mild    Chest/Lungs:  Clear to auscultation, Normal Respiratory Rate    Heart:  Rate: Normal  Rhythm: Regular Rhythm  Sounds: Normal          Anesthesia Plan  Type of Anesthesia, risks & benefits discussed:    Anesthesia Type: Gen Natural Airway  Intra-op Monitoring Plan: Standard ASA Monitors  Induction:  IV  Informed Consent: Informed consent signed with the Patient and all parties understand the risks and agree with anesthesia plan.  All questions answered.   ASA Score: 3  Day of Surgery Review of History & Physical: H&P Update referred to the surgeon/provider.    Ready For Surgery From Anesthesia Perspective.       .

## 2023-04-22 NOTE — ANESTHESIA POSTPROCEDURE EVALUATION
Anesthesia Post Evaluation    Patient: Pj Malone    Procedure(s) Performed: Procedure(s) (LRB):  COLONOSCOPY (N/A)    Final Anesthesia Type: general      Patient location during evaluation: PACU  Patient participation: Yes- Able to Participate  Level of consciousness: awake and alert  Post-procedure vital signs: reviewed and stable  Pain management: adequate  Airway patency: patent    PONV status at discharge: No PONV  Anesthetic complications: no      Cardiovascular status: blood pressure returned to baseline  Respiratory status: unassisted and room air  Hydration status: euvolemic  Follow-up not needed.          Vitals Value Taken Time   /63 04/18/23 1015   Temp 36.7 °C (98 °F) 04/18/23 0952   Pulse 73 04/18/23 1015   Resp 16 04/18/23 1015   SpO2 95 % 04/18/23 1015         Event Time   Out of Recovery 10:24:00         Pain/Litzy Score: No data recorded

## 2023-04-26 ENCOUNTER — TELEPHONE (OUTPATIENT)
Dept: SURGERY | Facility: CLINIC | Age: 73
End: 2023-04-26
Payer: MEDICARE

## 2023-04-26 ENCOUNTER — TELEPHONE (OUTPATIENT)
Dept: FAMILY MEDICINE | Facility: CLINIC | Age: 73
End: 2023-04-26
Payer: MEDICARE

## 2023-04-26 ENCOUNTER — LAB VISIT (OUTPATIENT)
Dept: LAB | Facility: HOSPITAL | Age: 73
End: 2023-04-26
Attending: INTERNAL MEDICINE
Payer: MEDICARE

## 2023-04-26 DIAGNOSIS — R73.9 HYPERGLYCEMIA: ICD-10-CM

## 2023-04-26 DIAGNOSIS — Z12.5 SCREENING PSA (PROSTATE SPECIFIC ANTIGEN): ICD-10-CM

## 2023-04-26 DIAGNOSIS — M51.36 DDD (DEGENERATIVE DISC DISEASE), LUMBAR: ICD-10-CM

## 2023-04-26 DIAGNOSIS — I10 PRIMARY HYPERTENSION: ICD-10-CM

## 2023-04-26 DIAGNOSIS — E78.5 DYSLIPIDEMIA: ICD-10-CM

## 2023-04-26 LAB
ALBUMIN SERPL BCP-MCNC: 4 G/DL (ref 3.5–5.2)
ALP SERPL-CCNC: 91 U/L (ref 55–135)
ALT SERPL W/O P-5'-P-CCNC: 23 U/L (ref 10–44)
ANION GAP SERPL CALC-SCNC: 6 MMOL/L (ref 8–16)
AST SERPL-CCNC: 24 U/L (ref 10–40)
BILIRUB SERPL-MCNC: 0.4 MG/DL (ref 0.1–1)
BUN SERPL-MCNC: 11 MG/DL (ref 8–23)
CALCIUM SERPL-MCNC: 9.9 MG/DL (ref 8.7–10.5)
CHLORIDE SERPL-SCNC: 102 MMOL/L (ref 95–110)
CHOLEST SERPL-MCNC: 157 MG/DL (ref 120–199)
CHOLEST/HDLC SERPL: 3.8 {RATIO} (ref 2–5)
CO2 SERPL-SCNC: 30 MMOL/L (ref 23–29)
COMPLEXED PSA SERPL-MCNC: 1.1 NG/ML (ref 0–4)
CREAT SERPL-MCNC: 1 MG/DL (ref 0.5–1.4)
EST. GFR  (NO RACE VARIABLE): >60 ML/MIN/1.73 M^2
ESTIMATED AVG GLUCOSE: 120 MG/DL (ref 68–131)
GLUCOSE SERPL-MCNC: 110 MG/DL (ref 70–110)
HBA1C MFR BLD: 5.8 % (ref 4–5.6)
HDLC SERPL-MCNC: 41 MG/DL (ref 40–75)
HDLC SERPL: 26.1 % (ref 20–50)
LDLC SERPL CALC-MCNC: 73 MG/DL (ref 63–159)
NONHDLC SERPL-MCNC: 116 MG/DL
POTASSIUM SERPL-SCNC: 4.8 MMOL/L (ref 3.5–5.1)
PROT SERPL-MCNC: 7.4 G/DL (ref 6–8.4)
SODIUM SERPL-SCNC: 138 MMOL/L (ref 136–145)
TRIGL SERPL-MCNC: 215 MG/DL (ref 30–150)

## 2023-04-26 PROCEDURE — 84153 ASSAY OF PSA TOTAL: CPT | Mod: GA | Performed by: INTERNAL MEDICINE

## 2023-04-26 PROCEDURE — 83036 HEMOGLOBIN GLYCOSYLATED A1C: CPT | Performed by: INTERNAL MEDICINE

## 2023-04-26 PROCEDURE — 36415 COLL VENOUS BLD VENIPUNCTURE: CPT | Mod: PO | Performed by: INTERNAL MEDICINE

## 2023-04-26 PROCEDURE — 80061 LIPID PANEL: CPT | Performed by: INTERNAL MEDICINE

## 2023-04-26 PROCEDURE — 80053 COMPREHEN METABOLIC PANEL: CPT | Performed by: INTERNAL MEDICINE

## 2023-04-26 RX ORDER — GABAPENTIN 600 MG/1
TABLET ORAL
Qty: 90 TABLET | Refills: 0 | Status: SHIPPED | OUTPATIENT
Start: 2023-04-26 | End: 2023-04-27 | Stop reason: SDUPTHER

## 2023-04-26 NOTE — TELEPHONE ENCOUNTER
Called and reviewed pathology with pt.      1. Cecum, polyp, polypectomy:   Colonic mucosa with preserved crypt architecture; no diagnostic abnormality.     2. Ascending colon, polyps x2, polypectomies:   Tubular adenomas.     3. Sigmoid colon, polyp, polypectomy:   Tubular adenoma.     Recommend repeat cscope in 3 years

## 2023-04-26 NOTE — TELEPHONE ENCOUNTER
Care Due:                  Date            Visit Type   Department     Provider  --------------------------------------------------------------------------------                                EP [facility] FAMILY  Last Visit: 12-      CARE ([facility])   MEDICINE       Damien Briceno                              EP -                              PRIMARY      [facility] FAM[facility]  Next Visit: 04-      CARE ([facility])   MEDICINE       NAVA DENG                                                            Last  Test          Frequency    Reason                     Performed    Due Date  --------------------------------------------------------------------------------    CMP.........  12 months..  lisinopriL, pravastatin..  04- 04-    Lipid Panel.  12 months..  pravastatin..............  04- 04-    [facility] Embedded Care Gaps. Reference number: [id number]. 4/26/2023   4:35:17 PM CDT

## 2023-04-26 NOTE — TELEPHONE ENCOUNTER
Left a voice message informing patient that provider will be out of the office.  We need to reschedule his appointment.

## 2023-04-26 NOTE — TELEPHONE ENCOUNTER
----- Message from Apolinar Ferro sent at 4/26/2023  3:22 PM CDT -----  Type:  RX Refill Request    Who Called:  pt   Refill or New Rx:  refill  RX Name and Strength:  gabapentin (NEURONTIN) 600 MG tablet  lisinopriL (PRINIVIL,ZESTRIL) 20 MG tablet  metoprolol succinate (TOPROL-XL) 25 MG 24 hr tablet  pravastatin (PRAVACHOL) 40 MG tablet  How is the patient currently taking it? (ex. 1XDay):  as directed  Is this a 30 day or 90 day RX:  90  Preferred Pharmacy with phone number:    [id number] [address] [facility]  [address] [facility] [address]  Phone: [phone] Fax: [phone]      Local or Mail Order:  local  Ordering Provider:  Rene Hernández Call Back Number:  [phone] (home)     Additional Information:  thank you

## 2023-04-26 NOTE — TELEPHONE ENCOUNTER
----- Message from NAVA López MD sent at 4/26/2023  2:10 PM CDT -----  Since there was a scheduling issue, let's make sure to accommodate him.  We can get him in at his convenience.

## 2023-04-27 ENCOUNTER — OFFICE VISIT (OUTPATIENT)
Dept: FAMILY MEDICINE | Facility: CLINIC | Age: 73
End: 2023-04-27
Payer: MEDICARE

## 2023-04-27 VITALS
DIASTOLIC BLOOD PRESSURE: 80 MMHG | WEIGHT: 279.13 LBS | HEART RATE: 82 BPM | BODY MASS INDEX: 37.81 KG/M2 | HEIGHT: 72 IN | OXYGEN SATURATION: 97 % | SYSTOLIC BLOOD PRESSURE: 122 MMHG

## 2023-04-27 DIAGNOSIS — E78.5 DYSLIPIDEMIA: ICD-10-CM

## 2023-04-27 DIAGNOSIS — I10 PRIMARY HYPERTENSION: Primary | ICD-10-CM

## 2023-04-27 DIAGNOSIS — I10 ESSENTIAL HYPERTENSION: ICD-10-CM

## 2023-04-27 DIAGNOSIS — M51.36 DDD (DEGENERATIVE DISC DISEASE), LUMBAR: ICD-10-CM

## 2023-04-27 DIAGNOSIS — N40.1 BENIGN PROSTATIC HYPERPLASIA WITH NOCTURIA: ICD-10-CM

## 2023-04-27 DIAGNOSIS — R73.01 IMPAIRED FASTING GLUCOSE: ICD-10-CM

## 2023-04-27 DIAGNOSIS — R35.1 BENIGN PROSTATIC HYPERPLASIA WITH NOCTURIA: ICD-10-CM

## 2023-04-27 DIAGNOSIS — I25.10 ATHEROSCLEROSIS OF NATIVE CORONARY ARTERY WITHOUT ANGINA PECTORIS, UNSPECIFIED WHETHER NATIVE OR TRANSPLANTED HEART: ICD-10-CM

## 2023-04-27 DIAGNOSIS — E66.01 MORBID OBESITY: ICD-10-CM

## 2023-04-27 PROCEDURE — 99214 PR OFFICE/OUTPT VISIT, EST, LEVL IV, 30-39 MIN: ICD-10-PCS | Mod: S$PBB,,, | Performed by: FAMILY MEDICINE

## 2023-04-27 PROCEDURE — 99999 PR PBB SHADOW E&M-EST. PATIENT-LVL III: CPT | Mod: PBBFAC,,, | Performed by: FAMILY MEDICINE

## 2023-04-27 PROCEDURE — 99999 PR PBB SHADOW E&M-EST. PATIENT-LVL III: ICD-10-PCS | Mod: PBBFAC,,, | Performed by: FAMILY MEDICINE

## 2023-04-27 PROCEDURE — 99213 OFFICE O/P EST LOW 20 MIN: CPT | Mod: PBBFAC,PO | Performed by: FAMILY MEDICINE

## 2023-04-27 PROCEDURE — 99214 OFFICE O/P EST MOD 30 MIN: CPT | Mod: S$PBB,,, | Performed by: FAMILY MEDICINE

## 2023-04-27 RX ORDER — GABAPENTIN 600 MG/1
TABLET ORAL
Qty: 90 TABLET | Refills: 3 | Status: SHIPPED | OUTPATIENT
Start: 2023-04-27 | End: 2023-08-02

## 2023-04-27 RX ORDER — PRAVASTATIN SODIUM 40 MG/1
40 TABLET ORAL DAILY
Qty: 90 TABLET | Refills: 3 | Status: SHIPPED | OUTPATIENT
Start: 2023-04-27

## 2023-04-27 RX ORDER — TAMSULOSIN HYDROCHLORIDE 0.4 MG/1
0.4 CAPSULE ORAL DAILY
Qty: 90 CAPSULE | Refills: 1 | Status: SHIPPED | OUTPATIENT
Start: 2023-04-27 | End: 2023-10-30

## 2023-04-27 RX ORDER — LISINOPRIL 20 MG/1
20 TABLET ORAL DAILY
Qty: 90 TABLET | Refills: 3 | Status: SHIPPED | OUTPATIENT
Start: 2023-04-27

## 2023-04-27 RX ORDER — METOPROLOL SUCCINATE 25 MG/1
25 TABLET, EXTENDED RELEASE ORAL DAILY
Qty: 90 TABLET | Refills: 3 | Status: SHIPPED | OUTPATIENT
Start: 2023-04-27

## 2023-04-27 NOTE — PROGRESS NOTES
Subjective:       Patient ID: Pj Malone is a 72 y.o. male.    Chief Complaint: Follow-up    Here for follow up multiple chronic medical issues. Doing well overall and in normal state of health.  Had labs yesterday and recent cscope.      Follow-up  Pertinent negatives include no chest pain, chills, coughing or fever.   Review of Systems   Constitutional:  Negative for chills and fever.   Respiratory:  Negative for cough, chest tightness and shortness of breath.    Cardiovascular:  Negative for chest pain, palpitations and leg swelling.   Endocrine: Negative for cold intolerance and heat intolerance.   Psychiatric/Behavioral:  Negative for decreased concentration. The patient is not nervous/anxious.      Objective:      Physical Exam  Vitals and nursing note reviewed.   Constitutional:       Appearance: He is well-developed.   HENT:      Head: Normocephalic and atraumatic.   Cardiovascular:      Rate and Rhythm: Normal rate and regular rhythm.      Heart sounds: Normal heart sounds.   Pulmonary:      Effort: Pulmonary effort is normal.      Breath sounds: Normal breath sounds.       Assessment:       1. Primary hypertension    2. Essential hypertension    3. Atherosclerosis of native coronary artery without angina pectoris, unspecified whether native or transplanted heart    4. DDD (degenerative disc disease), lumbar    5. Dyslipidemia    6. Morbid obesity    7. Impaired fasting glucose    8. Benign prostatic hyperplasia with nocturia        Plan:       Primary hypertension    Essential hypertension  -     metoprolol succinate (TOPROL-XL) 25 MG 24 hr tablet; Take 1 tablet (25 mg total) by mouth once daily.  Dispense: 90 tablet; Refill: 3  -     lisinopriL (PRINIVIL,ZESTRIL) 20 MG tablet; Take 1 tablet (20 mg total) by mouth once daily.  Dispense: 90 tablet; Refill: 3  -     CBC Without Differential; Future; Expected date: 04/27/2023  -     Comprehensive Metabolic Panel; Future; Expected date: 04/27/2023  -      Lipid Panel; Future; Expected date: 04/27/2023  -     TSH; Future; Expected date: 04/27/2023    Atherosclerosis of native coronary artery without angina pectoris, unspecified whether native or transplanted heart  -     pravastatin (PRAVACHOL) 40 MG tablet; Take 1 tablet (40 mg total) by mouth once daily.  Dispense: 90 tablet; Refill: 3    DDD (degenerative disc disease), lumbar  -     gabapentin (NEURONTIN) 600 MG tablet; TAKE 1 TABLET(600 MG) BY MOUTH DAILY AS NEEDED FOR BACK PAIN  Dispense: 90 tablet; Refill: 3    Dyslipidemia    Morbid obesity    Impaired fasting glucose  -     Hemoglobin A1C; Future; Expected date: 04/27/2023    Benign prostatic hyperplasia with nocturia    Other orders  -     tamsulosin (FLOMAX) 0.4 mg Cap; Take 1 capsule (0.4 mg total) by mouth once daily.  Dispense: 90 capsule; Refill: 1      Continue diet/exercise and wt loss  Flomax for bph and monitor  Htn stable  Lipid stable  Back pain stable  Will monitor chronic medical issues and continue current plan of care.    No follow-ups on file.

## 2023-07-31 DIAGNOSIS — M51.36 DDD (DEGENERATIVE DISC DISEASE), LUMBAR: ICD-10-CM

## 2023-07-31 NOTE — TELEPHONE ENCOUNTER
No care due was identified.  [facility] Embedded Care Due Messages. Reference number: [id number].   7/31/2023 4:05:33 AM CDT

## 2023-08-01 ENCOUNTER — PES CALL (OUTPATIENT)
Dept: ADMINISTRATIVE | Facility: CLINIC | Age: 73
End: 2023-08-01
Payer: MEDICARE

## 2023-08-02 RX ORDER — GABAPENTIN 600 MG/1
TABLET ORAL
Qty: 90 TABLET | Refills: 3 | Status: SHIPPED | OUTPATIENT
Start: 2023-08-02

## 2023-10-25 ENCOUNTER — TELEPHONE (OUTPATIENT)
Dept: FAMILY MEDICINE | Facility: CLINIC | Age: 73
End: 2023-10-25
Payer: MEDICARE

## 2023-10-25 NOTE — TELEPHONE ENCOUNTER
----- Message from Reina Steel sent at 10/25/2023 10:11 AM CDT -----  Name of Who is Calling: Pt       What is the request in detail: Pt stated he was told to be seen in Nov but there is nothing available on my end until 01/31. Pt is scheduled for 3pm. Pt stated he will be out of his medication before that and will need to have then refilled. Please assist with this matter        Can the clinic reply by [facility]: no       [facility]: [phone] (home)

## 2023-10-30 ENCOUNTER — LAB VISIT (OUTPATIENT)
Dept: LAB | Facility: HOSPITAL | Age: 73
End: 2023-10-30
Attending: FAMILY MEDICINE
Payer: MEDICARE

## 2023-10-30 ENCOUNTER — OFFICE VISIT (OUTPATIENT)
Dept: FAMILY MEDICINE | Facility: CLINIC | Age: 73
End: 2023-10-30
Payer: MEDICARE

## 2023-10-30 VITALS
HEART RATE: 64 BPM | WEIGHT: 280.88 LBS | OXYGEN SATURATION: 98 % | DIASTOLIC BLOOD PRESSURE: 82 MMHG | HEIGHT: 72 IN | SYSTOLIC BLOOD PRESSURE: 120 MMHG | BODY MASS INDEX: 38.04 KG/M2

## 2023-10-30 DIAGNOSIS — G62.9 NEUROPATHY: ICD-10-CM

## 2023-10-30 DIAGNOSIS — R73.01 ELEVATED FASTING GLUCOSE: ICD-10-CM

## 2023-10-30 DIAGNOSIS — I10 PRIMARY HYPERTENSION: Primary | ICD-10-CM

## 2023-10-30 DIAGNOSIS — R73.01 IMPAIRED FASTING GLUCOSE: ICD-10-CM

## 2023-10-30 DIAGNOSIS — E78.5 DYSLIPIDEMIA: ICD-10-CM

## 2023-10-30 DIAGNOSIS — I10 ESSENTIAL HYPERTENSION: ICD-10-CM

## 2023-10-30 LAB
ALBUMIN SERPL BCP-MCNC: 4.1 G/DL (ref 3.5–5.2)
ALP SERPL-CCNC: 81 U/L (ref 55–135)
ALT SERPL W/O P-5'-P-CCNC: 19 U/L (ref 10–44)
ANION GAP SERPL CALC-SCNC: 7 MMOL/L (ref 8–16)
AST SERPL-CCNC: 22 U/L (ref 10–40)
BILIRUB SERPL-MCNC: 0.5 MG/DL (ref 0.1–1)
BUN SERPL-MCNC: 15 MG/DL (ref 8–23)
CALCIUM SERPL-MCNC: 10 MG/DL (ref 8.7–10.5)
CHLORIDE SERPL-SCNC: 104 MMOL/L (ref 95–110)
CHOLEST SERPL-MCNC: 161 MG/DL (ref 120–199)
CHOLEST/HDLC SERPL: 3.7 {RATIO} (ref 2–5)
CO2 SERPL-SCNC: 28 MMOL/L (ref 23–29)
CREAT SERPL-MCNC: 1 MG/DL (ref 0.5–1.4)
ERYTHROCYTE [DISTWIDTH] IN BLOOD BY AUTOMATED COUNT: 13.8 % (ref 11.5–14.5)
EST. GFR  (NO RACE VARIABLE): >60 ML/MIN/1.73 M^2
ESTIMATED AVG GLUCOSE: 120 MG/DL (ref 68–131)
GLUCOSE SERPL-MCNC: 117 MG/DL (ref 70–110)
HBA1C MFR BLD: 5.8 % (ref 4–5.6)
HCT VFR BLD AUTO: 47.7 % (ref 40–54)
HDLC SERPL-MCNC: 43 MG/DL (ref 40–75)
HDLC SERPL: 26.7 % (ref 20–50)
HGB BLD-MCNC: 15.4 G/DL (ref 14–18)
LDLC SERPL CALC-MCNC: 79.2 MG/DL (ref 63–159)
MCH RBC QN AUTO: 28.8 PG (ref 27–31)
MCHC RBC AUTO-ENTMCNC: 32.3 G/DL (ref 32–36)
MCV RBC AUTO: 89 FL (ref 82–98)
NONHDLC SERPL-MCNC: 118 MG/DL
PLATELET # BLD AUTO: 204 K/UL (ref 150–450)
PMV BLD AUTO: 11.4 FL (ref 9.2–12.9)
POTASSIUM SERPL-SCNC: 4.3 MMOL/L (ref 3.5–5.1)
PROT SERPL-MCNC: 7.5 G/DL (ref 6–8.4)
RBC # BLD AUTO: 5.35 M/UL (ref 4.6–6.2)
SODIUM SERPL-SCNC: 139 MMOL/L (ref 136–145)
TRIGL SERPL-MCNC: 194 MG/DL (ref 30–150)
TSH SERPL DL<=0.005 MIU/L-ACNC: 1.4 UIU/ML (ref 0.4–4)
WBC # BLD AUTO: 9.16 K/UL (ref 3.9–12.7)

## 2023-10-30 PROCEDURE — 36415 COLL VENOUS BLD VENIPUNCTURE: CPT | Mod: PO | Performed by: FAMILY MEDICINE

## 2023-10-30 PROCEDURE — 83036 HEMOGLOBIN GLYCOSYLATED A1C: CPT | Performed by: FAMILY MEDICINE

## 2023-10-30 PROCEDURE — 99213 OFFICE O/P EST LOW 20 MIN: CPT | Mod: PBBFAC,PO,25 | Performed by: FAMILY MEDICINE

## 2023-10-30 PROCEDURE — 80061 LIPID PANEL: CPT | Performed by: FAMILY MEDICINE

## 2023-10-30 PROCEDURE — 85027 COMPLETE CBC AUTOMATED: CPT | Performed by: FAMILY MEDICINE

## 2023-10-30 PROCEDURE — 99999 PR PBB SHADOW E&M-EST. PATIENT-LVL III: CPT | Mod: PBBFAC,,, | Performed by: FAMILY MEDICINE

## 2023-10-30 PROCEDURE — 99999PBSHW FLU VACCINE - QUADRIVALENT - ADJUVANTED: Mod: PBBFAC,,,

## 2023-10-30 PROCEDURE — 99999PBSHW FLU VACCINE - QUADRIVALENT - ADJUVANTED: ICD-10-PCS | Mod: PBBFAC,,,

## 2023-10-30 PROCEDURE — 80053 COMPREHEN METABOLIC PANEL: CPT | Performed by: FAMILY MEDICINE

## 2023-10-30 PROCEDURE — 99214 OFFICE O/P EST MOD 30 MIN: CPT | Mod: S$PBB,,, | Performed by: FAMILY MEDICINE

## 2023-10-30 PROCEDURE — 99999 PR PBB SHADOW E&M-EST. PATIENT-LVL III: ICD-10-PCS | Mod: PBBFAC,,, | Performed by: FAMILY MEDICINE

## 2023-10-30 PROCEDURE — 84443 ASSAY THYROID STIM HORMONE: CPT | Performed by: FAMILY MEDICINE

## 2023-10-30 PROCEDURE — G0008 ADMIN INFLUENZA VIRUS VAC: HCPCS | Mod: PBBFAC,PO

## 2023-10-30 PROCEDURE — 99214 PR OFFICE/OUTPT VISIT, EST, LEVL IV, 30-39 MIN: ICD-10-PCS | Mod: S$PBB,,, | Performed by: FAMILY MEDICINE

## 2023-10-30 NOTE — PROGRESS NOTES
Subjective:       Patient ID: Pj Malone is a 72 y.o. male.    Chief Complaint: Follow-up (6 month )    Here for follow up multiple chronic medical issues. Doing well overall and in normal state of health.  Had labs today.  Some foot pain after walking at end of day.      Follow-up  Pertinent negatives include no chest pain, chills, coughing or fever.     Review of Systems   Constitutional:  Negative for chills and fever.   Respiratory:  Negative for cough, chest tightness and shortness of breath.    Cardiovascular:  Negative for chest pain, palpitations and leg swelling.   Endocrine: Negative for cold intolerance and heat intolerance.   Psychiatric/Behavioral:  Negative for decreased concentration. The patient is not nervous/anxious.        Objective:      Physical Exam  Vitals and nursing note reviewed.   Constitutional:       Appearance: He is well-developed.   HENT:      Head: Normocephalic and atraumatic.   Cardiovascular:      Rate and Rhythm: Normal rate and regular rhythm.      Heart sounds: Normal heart sounds.   Pulmonary:      Effort: Pulmonary effort is normal.      Breath sounds: Normal breath sounds.         Assessment:       1. Primary hypertension    2. Dyslipidemia    3. Neuropathy    4. Elevated fasting glucose        Plan:       Primary hypertension  -     Comprehensive Metabolic Panel; Future; Expected date: 10/30/2023  -     CBC Without Differential; Future; Expected date: 10/30/2023  -     Hemoglobin A1C; Future; Expected date: 10/30/2023  -     Lipid Panel; Future; Expected date: 10/30/2023  -     TSH; Future; Expected date: 10/30/2023    Dyslipidemia    Neuropathy    Elevated fasting glucose  -     Comprehensive Metabolic Panel; Future; Expected date: 10/30/2023  -     CBC Without Differential; Future; Expected date: 10/30/2023  -     Hemoglobin A1C; Future; Expected date: 10/30/2023  -     Lipid Panel; Future; Expected date: 10/30/2023  -     TSH; Future; Expected date:  10/30/2023      Htn stable  Lipid stable  Supportive shoes vs neuropathy; monitor; take gabapentin routinely and not prn  Will monitor chronic medical issues and continue current plan of care.  Review labs once availabe      Follow up in about 6 months (around 4/30/2024), or if symptoms worsen or fail to improve.

## 2024-05-01 ENCOUNTER — OFFICE VISIT (OUTPATIENT)
Dept: FAMILY MEDICINE | Facility: CLINIC | Age: 74
End: 2024-05-01
Payer: MEDICARE

## 2024-05-01 ENCOUNTER — LAB VISIT (OUTPATIENT)
Dept: LAB | Facility: HOSPITAL | Age: 74
End: 2024-05-01
Attending: FAMILY MEDICINE
Payer: MEDICARE

## 2024-05-01 VITALS
DIASTOLIC BLOOD PRESSURE: 64 MMHG | SYSTOLIC BLOOD PRESSURE: 110 MMHG | HEART RATE: 84 BPM | OXYGEN SATURATION: 96 % | HEIGHT: 72 IN | BODY MASS INDEX: 39.84 KG/M2 | WEIGHT: 294.13 LBS

## 2024-05-01 DIAGNOSIS — Z12.5 SCREENING FOR MALIGNANT NEOPLASM OF PROSTATE: ICD-10-CM

## 2024-05-01 DIAGNOSIS — E66.01 MORBID OBESITY: ICD-10-CM

## 2024-05-01 DIAGNOSIS — M25.50 ARTHRALGIA, UNSPECIFIED JOINT: ICD-10-CM

## 2024-05-01 DIAGNOSIS — I10 PRIMARY HYPERTENSION: ICD-10-CM

## 2024-05-01 DIAGNOSIS — R73.09 ELEVATED GLUCOSE: ICD-10-CM

## 2024-05-01 DIAGNOSIS — I10 PRIMARY HYPERTENSION: Primary | ICD-10-CM

## 2024-05-01 DIAGNOSIS — E78.5 DYSLIPIDEMIA: ICD-10-CM

## 2024-05-01 LAB
ALBUMIN SERPL BCP-MCNC: 3.8 G/DL (ref 3.5–5.2)
ALP SERPL-CCNC: 85 U/L (ref 55–135)
ALT SERPL W/O P-5'-P-CCNC: 23 U/L (ref 10–44)
ANION GAP SERPL CALC-SCNC: 13 MMOL/L (ref 8–16)
AST SERPL-CCNC: 19 U/L (ref 10–40)
BILIRUB SERPL-MCNC: 0.4 MG/DL (ref 0.1–1)
BUN SERPL-MCNC: 12 MG/DL (ref 8–23)
CALCIUM SERPL-MCNC: 9.5 MG/DL (ref 8.7–10.5)
CHLORIDE SERPL-SCNC: 105 MMOL/L (ref 95–110)
CHOLEST SERPL-MCNC: 150 MG/DL (ref 120–199)
CHOLEST/HDLC SERPL: 3.7 {RATIO} (ref 2–5)
CO2 SERPL-SCNC: 21 MMOL/L (ref 23–29)
COMPLEXED PSA SERPL-MCNC: 0.41 NG/ML (ref 0–4)
CREAT SERPL-MCNC: 1 MG/DL (ref 0.5–1.4)
ERYTHROCYTE [DISTWIDTH] IN BLOOD BY AUTOMATED COUNT: 13.2 % (ref 11.5–14.5)
EST. GFR  (NO RACE VARIABLE): >60 ML/MIN/1.73 M^2
ESTIMATED AVG GLUCOSE: 128 MG/DL (ref 68–131)
GLUCOSE SERPL-MCNC: 108 MG/DL (ref 70–110)
HBA1C MFR BLD: 6.1 % (ref 4–5.6)
HCT VFR BLD AUTO: 44.8 % (ref 40–54)
HDLC SERPL-MCNC: 41 MG/DL (ref 40–75)
HDLC SERPL: 27.3 % (ref 20–50)
HGB BLD-MCNC: 14.6 G/DL (ref 14–18)
LDLC SERPL CALC-MCNC: 60.2 MG/DL (ref 63–159)
MCH RBC QN AUTO: 29.3 PG (ref 27–31)
MCHC RBC AUTO-ENTMCNC: 32.6 G/DL (ref 32–36)
MCV RBC AUTO: 90 FL (ref 82–98)
NONHDLC SERPL-MCNC: 109 MG/DL
PLATELET # BLD AUTO: 216 K/UL (ref 150–450)
PMV BLD AUTO: 10.2 FL (ref 9.2–12.9)
POTASSIUM SERPL-SCNC: 4.2 MMOL/L (ref 3.5–5.1)
PROT SERPL-MCNC: 7 G/DL (ref 6–8.4)
RBC # BLD AUTO: 4.98 M/UL (ref 4.6–6.2)
SODIUM SERPL-SCNC: 139 MMOL/L (ref 136–145)
TRIGL SERPL-MCNC: 244 MG/DL (ref 30–150)
TSH SERPL DL<=0.005 MIU/L-ACNC: 1.01 UIU/ML (ref 0.4–4)
URATE SERPL-MCNC: 6.4 MG/DL (ref 3.4–7)
WBC # BLD AUTO: 7.97 K/UL (ref 3.9–12.7)

## 2024-05-01 PROCEDURE — 99999 PR PBB SHADOW E&M-EST. PATIENT-LVL III: CPT | Mod: PBBFAC,,, | Performed by: FAMILY MEDICINE

## 2024-05-01 PROCEDURE — 80061 LIPID PANEL: CPT | Performed by: FAMILY MEDICINE

## 2024-05-01 PROCEDURE — 99214 OFFICE O/P EST MOD 30 MIN: CPT | Mod: S$PBB,,, | Performed by: FAMILY MEDICINE

## 2024-05-01 PROCEDURE — 80053 COMPREHEN METABOLIC PANEL: CPT | Performed by: FAMILY MEDICINE

## 2024-05-01 PROCEDURE — 99213 OFFICE O/P EST LOW 20 MIN: CPT | Mod: PBBFAC,PO | Performed by: FAMILY MEDICINE

## 2024-05-01 PROCEDURE — 84443 ASSAY THYROID STIM HORMONE: CPT | Performed by: FAMILY MEDICINE

## 2024-05-01 PROCEDURE — 36415 COLL VENOUS BLD VENIPUNCTURE: CPT | Mod: PO | Performed by: FAMILY MEDICINE

## 2024-05-01 PROCEDURE — 84550 ASSAY OF BLOOD/URIC ACID: CPT | Performed by: FAMILY MEDICINE

## 2024-05-01 PROCEDURE — 84153 ASSAY OF PSA TOTAL: CPT | Performed by: FAMILY MEDICINE

## 2024-05-01 PROCEDURE — 83036 HEMOGLOBIN GLYCOSYLATED A1C: CPT | Performed by: FAMILY MEDICINE

## 2024-05-01 PROCEDURE — 85027 COMPLETE CBC AUTOMATED: CPT | Performed by: FAMILY MEDICINE

## 2024-05-01 NOTE — PROGRESS NOTES
Subjective:       Patient ID: Pj Malone is a 73 y.o. male.    Chief Complaint: Follow-up (6 month follow up . )    Here for follow up multiple chronic medical issues. Doing well overall and in normal state of health.      Follow-up  Pertinent negatives include no chest pain, chills, coughing or fever.     Review of Systems   Constitutional:  Negative for chills and fever.   Respiratory:  Negative for cough, chest tightness and shortness of breath.    Cardiovascular:  Negative for chest pain, palpitations and leg swelling.   Endocrine: Negative for cold intolerance and heat intolerance.   Psychiatric/Behavioral:  Negative for decreased concentration. The patient is not nervous/anxious.        Objective:      Physical Exam  Vitals and nursing note reviewed.   Constitutional:       Appearance: He is well-developed.   HENT:      Head: Normocephalic and atraumatic.   Cardiovascular:      Rate and Rhythm: Normal rate and regular rhythm.      Heart sounds: Normal heart sounds.   Pulmonary:      Effort: Pulmonary effort is normal.      Breath sounds: Normal breath sounds.   Psychiatric:         Mood and Affect: Mood normal.         Behavior: Behavior normal.         Assessment:       1. Primary hypertension    2. Dyslipidemia    3. Elevated glucose    4. Screening for malignant neoplasm of prostate    5. Arthralgia, unspecified joint    6. Morbid obesity        Plan:       Primary hypertension  -     CBC Without Differential; Future; Expected date: 05/01/2024  -     Comprehensive Metabolic Panel; Future; Expected date: 05/01/2024  -     Lipid Panel; Future; Expected date: 05/01/2024  -     TSH; Future; Expected date: 05/01/2024    Dyslipidemia    Elevated glucose  -     Hemoglobin A1C; Future; Expected date: 05/01/2024    Screening for malignant neoplasm of prostate  -     PSA, Screening; Future; Expected date: 05/01/2024    Arthralgia, unspecified joint  -     URIC ACID; Future; Expected date: 05/01/2024    Morbid  obesity        Diet/exercise and wt loss  Htn stable  Lipid stable  Update labs and health maintenance  Will monitor chronic medical issues and continue current plan of care.  Visit today included increased complexity associated with the care of the episodic problem htn addressed and managing the longitudinal care of the patient due to the serious and/or complex managed problem(s) as above.    No follow-ups on file.

## 2024-05-02 ENCOUNTER — TELEPHONE (OUTPATIENT)
Dept: FAMILY MEDICINE | Facility: CLINIC | Age: 74
End: 2024-05-02
Payer: MEDICARE

## 2024-05-02 NOTE — TELEPHONE ENCOUNTER
----- Message from NAVA López MD sent at 5/2/2024  3:15 PM CDT -----  Labs have been reviewed and are in acceptable range.

## 2024-06-20 DIAGNOSIS — I10 ESSENTIAL HYPERTENSION: ICD-10-CM

## 2024-06-20 DIAGNOSIS — I25.10 ATHEROSCLEROSIS OF NATIVE CORONARY ARTERY WITHOUT ANGINA PECTORIS, UNSPECIFIED WHETHER NATIVE OR TRANSPLANTED HEART: ICD-10-CM

## 2024-06-20 RX ORDER — PRAVASTATIN SODIUM 40 MG/1
40 TABLET ORAL
Qty: 90 TABLET | Refills: 3 | Status: SHIPPED | OUTPATIENT
Start: 2024-06-20

## 2024-06-20 RX ORDER — LISINOPRIL 20 MG/1
20 TABLET ORAL
Qty: 90 TABLET | Refills: 3 | Status: SHIPPED | OUTPATIENT
Start: 2024-06-20

## 2024-06-20 RX ORDER — METOPROLOL SUCCINATE 25 MG/1
25 TABLET, EXTENDED RELEASE ORAL
Qty: 90 TABLET | Refills: 3 | Status: SHIPPED | OUTPATIENT
Start: 2024-06-20

## 2024-06-20 NOTE — TELEPHONE ENCOUNTER
No care due was identified.  Health [facility] Embedded Care Due Messages. Reference number: [id number].   6/20/2024 4:07:09 AM CDT

## 2024-06-20 NOTE — TELEPHONE ENCOUNTER
Refill Decision Note   Pj Malone  is requesting a refill authorization.  Brief Assessment and Rationale for Refill:  Approve     Medication Therapy Plan:         Comments:     Note composed:10:23 AM 06/20/2024

## 2024-07-09 DIAGNOSIS — I25.10 ATHEROSCLEROSIS OF NATIVE CORONARY ARTERY WITHOUT ANGINA PECTORIS, UNSPECIFIED WHETHER NATIVE OR TRANSPLANTED HEART: ICD-10-CM

## 2024-07-09 DIAGNOSIS — I10 ESSENTIAL HYPERTENSION: ICD-10-CM

## 2024-07-09 RX ORDER — LISINOPRIL 20 MG/1
20 TABLET ORAL
Qty: 90 TABLET | Refills: 3 | Status: SHIPPED | OUTPATIENT
Start: 2024-07-09

## 2024-07-09 RX ORDER — METOPROLOL SUCCINATE 25 MG/1
25 TABLET, EXTENDED RELEASE ORAL
Qty: 90 TABLET | Refills: 3 | Status: SHIPPED | OUTPATIENT
Start: 2024-07-09

## 2024-07-09 RX ORDER — PRAVASTATIN SODIUM 40 MG/1
40 TABLET ORAL
Qty: 90 TABLET | Refills: 3 | Status: SHIPPED | OUTPATIENT
Start: 2024-07-09

## 2024-07-09 NOTE — TELEPHONE ENCOUNTER
No care due was identified.  Health [facility] Embedded Care Due Messages. Reference number: [id number].   7/09/2024 11:37:17 AM CDT

## 2024-07-09 NOTE — TELEPHONE ENCOUNTER
----- Message from JoshTashaLalit Negroway MA sent at 7/9/2024  2:36 PM CDT -----  Contact: self    ----- Message -----  From: Art Cooper  Sent: 7/9/2024  12:43 PM CDT  To: Rene Greco Staff    Type: RX Refill Request        Who Called: patient   Refill or New Rx: refill  RX Name and Strength:   lisinopriL (PRINIVIL,ZESTRIL) 20 MG tablet  pravastatin (PRAVACHOL) 40 MG tablet  aspirin (ECOTRIN) 81 MG EC tablet   metoprolol succinate (TOPROL-XL) 25 MG 24 hr tablet    How is the patient currently taking it? (ex. 1XDay): as directed   Is this a 30 day or 90 day RX: n/a  Preferred Pharmacy with phone number:   [facility] #[id number] - [address] [address]  Phone: [phone] Fax: [phone]  Local or Mail Order: local   Ordering Provider: Dr. Rene Hernández Call Back Number: [phone]  Additional Information: Plz refill for the pt. Thanks

## 2024-09-24 ENCOUNTER — OFFICE VISIT (OUTPATIENT)
Dept: FAMILY MEDICINE | Facility: CLINIC | Age: 74
End: 2024-09-24
Payer: MEDICARE

## 2024-09-24 VITALS
SYSTOLIC BLOOD PRESSURE: 130 MMHG | HEIGHT: 72 IN | BODY MASS INDEX: 37.81 KG/M2 | DIASTOLIC BLOOD PRESSURE: 78 MMHG | OXYGEN SATURATION: 97 % | HEART RATE: 66 BPM | WEIGHT: 279.13 LBS

## 2024-09-24 DIAGNOSIS — I25.5 CARDIOMYOPATHY, ISCHEMIC: ICD-10-CM

## 2024-09-24 DIAGNOSIS — Z00.00 ENCOUNTER FOR MEDICARE ANNUAL WELLNESS EXAM: Primary | ICD-10-CM

## 2024-09-24 DIAGNOSIS — E78.5 DYSLIPIDEMIA: ICD-10-CM

## 2024-09-24 DIAGNOSIS — I25.10 CAD IN NATIVE ARTERY: ICD-10-CM

## 2024-09-24 DIAGNOSIS — Z23 NEED FOR INFLUENZA VACCINATION: ICD-10-CM

## 2024-09-24 DIAGNOSIS — I10 PRIMARY HYPERTENSION: ICD-10-CM

## 2024-09-24 DIAGNOSIS — E66.01 MORBID OBESITY: ICD-10-CM

## 2024-09-24 PROCEDURE — 90653 IIV ADJUVANT VACCINE IM: CPT | Mod: PBBFAC,PO

## 2024-09-24 PROCEDURE — 99213 OFFICE O/P EST LOW 20 MIN: CPT | Mod: PBBFAC,PO,25 | Performed by: NURSE PRACTITIONER

## 2024-09-24 PROCEDURE — 99999PBSHW PR PBB SHADOW TECHNICAL ONLY FILED TO HB: Mod: PBBFAC,,,

## 2024-09-24 PROCEDURE — G0439 PPPS, SUBSEQ VISIT: HCPCS | Mod: ,,, | Performed by: NURSE PRACTITIONER

## 2024-09-24 PROCEDURE — G0008 ADMIN INFLUENZA VIRUS VAC: HCPCS | Mod: PBBFAC,PO

## 2024-09-24 PROCEDURE — 99999 PR PBB SHADOW E&M-EST. PATIENT-LVL III: CPT | Mod: PBBFAC,,, | Performed by: NURSE PRACTITIONER

## 2024-09-24 RX ADMIN — INFLUENZA A VIRUS A/VICTORIA/4897/2022 IVR-238 (H1N1) ANTIGEN (FORMALDEHYDE INACTIVATED), INFLUENZA A VIRUS A/THAILAND/8/2022 IVR-237 (H3N2) ANTIGEN (FORMALDEHYDE INACTIVATED), INFLUENZA B VIRUS B/AUSTRIA/1359417/2021 BVR-26 ANTIGEN (FORMALDEHYDE INACTIVATED) 0.5 ML: 15; 15; 15 INJECTION, SUSPENSION INTRAMUSCULAR at 08:09

## 2024-09-24 NOTE — PATIENT INSTRUCTIONS
Counseling and Referral of Other Preventative  (Italic type indicates deductible and co-insurance are waived)    Patient Name: Pj Malone  Today's Date: 9/24/2024    Health Maintenance       Date Due Completion Date    RSV Vaccine (Age 60+ and Pregnant patients) (1 - 1-dose 60+ series) Never done ---    Shingles Vaccine (2 of 3) 06/12/2017 4/17/2017    Pneumococcal Vaccines (Age 65+) (2 of 2 - PCV) 04/07/2018 4/7/2017    COVID-19 Vaccine (3 - 2023-24 season) 09/01/2024 3/19/2021    Hemoglobin A1c (Prediabetes) 05/01/2025 5/1/2024    High Dose Statin 07/09/2025 7/9/2024    Colorectal Cancer Screening 04/18/2026 4/18/2023    TETANUS VACCINE 04/07/2027 4/7/2017    Lipid Panel 05/01/2029 5/1/2024        No orders of the defined types were placed in this encounter.      The following information is provided to all patients.  This information is to help you find resources for any of the problems found today that may be affecting your health:                  Living healthy guide: www.[facility].[place].gov      Understanding Diabetes: www.diabetes.org      Eating healthy: www.cdc.gov/healthyweight      CDC home safety checklist: www.cdc.gov/steadi/patient.html      Agency on Aging: www.goea.[place].[facility]      Alcoholics anonymous (AA): www.aa.org      Physical Activity: www.cassy.nih.gov/hl7jfwu      Tobacco use: www.quitwithusla.org

## 2024-09-24 NOTE — PROGRESS NOTES
jP Malone presented for an initial Medicare AWV today. The following components were reviewed and updated:    Medical history  Family History  Social history  Allergies and Current Medications  Health Risk Assessment  Health Maintenance  Care Team    **See Completed Assessments for Annual Wellness visit with in the encounter summary    The following assessments were completed:  Depression Screening  Cognitive function Screening    Timed Get Up Test  Whisper Test      Opioid documentation:      Patient does not have a current opioid prescription.        Reports marijuana use 1-2x/week.  Encouraged to abstain from marijuana use.       Vitals:    09/24/24 0824   BP: 130/78   BP Location: Left arm   Patient Position: Sitting   BP Method: Medium (Manual)   Pulse: 66   SpO2: 97%   Weight: 126.6 kg (279 lb 1.6 oz)   Height: 6' (1.829 m)     Body mass index is 37.85 kg/m².       Physical Exam  Vitals reviewed.   Constitutional:       General: He is not in acute distress.  HENT:      Head: Normocephalic.   Cardiovascular:      Rate and Rhythm: Normal rate.   Pulmonary:      Effort: Pulmonary effort is normal. No respiratory distress.   Skin:     General: Skin is warm.   Neurological:      General: No focal deficit present.      Mental Status: He is alert.   Psychiatric:         Mood and Affect: Mood normal.           Diagnoses and health risks identified today and associated recommendations/orders:  1. Encounter for Medicare annual wellness exam  Reviewed health maintenance and provided recommendations    Recommend PCV and shingrix vaccines  Received flu vaccine today    2. Cardiomyopathy, ischemic - EF 35-40% ---> 55% echo 03/2013  Continue to monitor  Followed by NAVA López MD   Encourage cardiovascular exercise.      3. Primary hypertension  Continue to monitor  Followed by NAVA López MD .   Lisinopril  metoprolol     4. Dyslipidemia  Continue to monitor  Followed by NAVA López MD    pravastatin.      5. Morbid obesity  Continue to monitor  Followed by NAVA López MD   Encourage increase in daily activity  Encourage healthy food chocies.      6. CAD in native artery - occluded RCA with collaterals; mild non-obstructive Dz in LAD, LCX - cath 2007  Continue to monitor  Followed by NAVA López MD   No CP.      7. Need for influenza vaccination    - influenza (adjuvanted) (Fluad) 45 mcg/0.5 mL IM vaccine (> or = 64 yo) 0.5 mL      Provided Pj with a 5-10 year written screening schedule and personal prevention plan. Recommendations were developed using the USPSTF age appropriate recommendations. Education, counseling, and referrals were provided as needed.  After Visit Summary printed and given to patient which includes a list of additional screenings\tests needed.    No follow-ups on file.      Bev Tsang NP

## 2024-10-06 DIAGNOSIS — M51.369 DDD (DEGENERATIVE DISC DISEASE), LUMBAR: ICD-10-CM

## 2024-10-06 NOTE — TELEPHONE ENCOUNTER
No care due was identified.  [facility] Embedded Care Due Messages. Reference number: [id number].   10/06/2024 9:45:47 AM CDT

## 2024-10-07 RX ORDER — GABAPENTIN 600 MG/1
TABLET ORAL
Qty: 90 TABLET | Refills: 3 | Status: SHIPPED | OUTPATIENT
Start: 2024-10-07

## 2025-05-07 DIAGNOSIS — I10 HYPERTENSION: ICD-10-CM

## 2025-06-17 DIAGNOSIS — I10 ESSENTIAL HYPERTENSION: ICD-10-CM

## 2025-06-17 DIAGNOSIS — I25.10 ATHEROSCLEROSIS OF NATIVE CORONARY ARTERY WITHOUT ANGINA PECTORIS, UNSPECIFIED WHETHER NATIVE OR TRANSPLANTED HEART: ICD-10-CM

## 2025-06-17 DIAGNOSIS — M51.369 DDD (DEGENERATIVE DISC DISEASE), LUMBAR: ICD-10-CM

## 2025-06-17 RX ORDER — LISINOPRIL 20 MG/1
20 TABLET ORAL
Qty: 90 TABLET | Refills: 3 | Status: CANCELLED | OUTPATIENT
Start: 2025-06-17

## 2025-06-17 RX ORDER — METOPROLOL SUCCINATE 25 MG/1
25 TABLET, EXTENDED RELEASE ORAL
Qty: 90 TABLET | Refills: 3 | Status: CANCELLED | OUTPATIENT
Start: 2025-06-17

## 2025-06-17 RX ORDER — GABAPENTIN 600 MG/1
TABLET ORAL
Qty: 90 TABLET | Refills: 3 | Status: CANCELLED | OUTPATIENT
Start: 2025-06-17

## 2025-06-17 RX ORDER — PRAVASTATIN SODIUM 40 MG/1
40 TABLET ORAL
Qty: 90 TABLET | Refills: 3 | Status: CANCELLED | OUTPATIENT
Start: 2025-06-17

## 2025-06-17 NOTE — TELEPHONE ENCOUNTER
Care Due:                  Date            Visit Type   Department     Provider  --------------------------------------------------------------------------------                                EP -                              PRIMARY      [facility] FAMILY  Last Visit: 05-      CARE ([facility])   MEDICINE       ANTONIO López  Next Visit: None Scheduled  None         None Found                                                            Last  Test          Frequency    Reason                     Performed    Due Date  --------------------------------------------------------------------------------    Office Visit  15 months..  lisinopriL, metoprolol,    05- 07-                             pravastatin..............    CMP.........  12 months..  lisinopriL, pravastatin..  05- 04-    Lipid Panel.  12 months..  pravastatin..............  05- 04-    Health [facility] Embedded Care Due Messages. Reference number: [id number].   6/17/2025 1:19:12 PM CDT

## 2025-06-17 NOTE — TELEPHONE ENCOUNTER
Copied from CRM #[id number]. Topic: Medications - Medication Refill  >> Jun 17, 2025 11:24 AM Guevara wrote:  Type:  RX Refill Request    Who Called: PT  Refill or New Rx:Refill  RX Name and Strength: Disp Refills Start End   metoprolol succinate (TOPROL-XL) 25 MG 24 hr tablet 90 tablet 3 7/9/2024 -   Sig - Route: TAKE 1 TABLET(25 MG) BY MOUTH EVERY DAY - Oral   Sent to pharmacy as: metoprolol succinate (TOPROL-XL) 25 MG 24 hr tablet   Notes to Pharmacy: .   E-Prescribing Status: Receipt confirmed by pharmacy (7/9/2024  3:05 PM CDT)       How is the patient currently taking it? (ex. 1XDay):See Above  Is this a 30 day or 90 day RX: 90   Preferred Pharmacy with phone number:   [facility]/pharmacy #[id number] - [place] - [address]  [address] [place] [address] AVE.  [place] [address]  Phone: [phone] Fax: [phone]      Local or Mail Order:Local   Ordering Provider:NAVA DENG   Would the patient rather a call back or a response via [facility]? Call back   Best Call Back Number:[phone]    Additional Information:

## 2025-06-17 NOTE — TELEPHONE ENCOUNTER
Copied from CRM #[id number]. Topic: Medications - Medication Refill  >> Jun 17, 2025 11:31 AM Guevara wrote:  Type:  RX Refill Request    Who Called: PT  Refill or New Rx:Refill  RX Name and Strength: Disp Refills Start End   gabapentin (NEURONTIN) 600 MG tablet 90 tablet 3 10/7/2024 -   Sig: TAKE 1 TABLET(600 MG) BY MOUTH DAILY AS NEEDED FOR BACK PAIN   Sent to pharmacy as: gabapentin (NEURONTIN) 600 MG tablet   Earliest Fill Date: 10/7/2024   E-Prescribing Status: Receipt confirmed by pharmacy (10/7/2024 11:46 AM CDT)           How is the patient currently taking it? (ex. 1XDay):See Above  Is this a 30 day or 90 day RX: 90   Preferred Pharmacy with phone number:   [facility]/pharmacy #[id number] - [place] [address]  [address] [place], [place] [address].  [place] [address]  Phone: [phone] Fax: [phone]      Local or Mail Order:Local   Ordering Provider:NAVA DENG   Would the patient rather a call back or a response via [facility]? Call back   Best Call Back Number:[phone]    Additional Information:

## 2025-06-17 NOTE — TELEPHONE ENCOUNTER
Copied from CRM #[id number]. Topic: Medications - Medication Refill  >> Jun 17, 2025 11:32 AM Guevara wrote:  Type:  RX Refill Request    Who Called: PT  Refill or New Rx:Refill  RX Name and Strength: Disp Refills Start End   pravastatin (PRAVACHOL) 40 MG tablet 90 tablet 3 7/9/2024 -   Sig - Route: TAKE 1 TABLET(40 MG) BY MOUTH EVERY DAY - Oral   Sent to pharmacy as: pravastatin (PRAVACHOL) 40 MG tablet   E-Prescribing Status: Receipt confirmed by pharmacy (7/9/2024  3:05 PM CDT)       How is the patient currently taking it? (ex. 1XDay):See Above  Is this a 30 day or 90 day RX: 90   Preferred Pharmacy with phone number:   [facility]/pharmacy #[id number] - [place] - [address] [place] [address].  [address] [place] [address].  [place] [address]  Phone: [phone] Fax: [phone]      Local or Mail Order:Local   Ordering Provider:NAVA DENG   Would the patient rather a call back or a response via [facility]? Call back   Best Call Back Number:[phone]    Additional Information:

## 2025-06-17 NOTE — TELEPHONE ENCOUNTER
Copied from CRM #[id number]. Topic: Medications - Medication Refill  >> Jun 17, 2025 11:24 AM Guevara wrote:  Type:  RX Refill Request    Who Called: PT  Refill or New Rx:Refill  RX Name and Strength: Disp Refills Start End   metoprolol succinate (TOPROL-XL) 25 MG 24 hr tablet 90 tablet 3 7/9/2024 -   Sig - Route: TAKE 1 TABLET(25 MG) BY MOUTH EVERY DAY - Oral   Sent to pharmacy as: metoprolol succinate (TOPROL-XL) 25 MG 24 hr tablet   Notes to Pharmacy: .   E-Prescribing Status: Receipt confirmed by pharmacy (7/9/2024  3:05 PM CDT)       How is the patient currently taking it? (ex. 1XDay):See Above  Is this a 30 day or 90 day RX: 90   Preferred Pharmacy with phone number:   [facility]/pharmacy #[id number] - [place] - [address]  [address] [place] [address] [address]VE.  [place] [address]  Phone: [phone] Fax: [phone]      Local or Mail Order:Local   Ordering Provider:NAVA DENG   Would the patient rather a call back or a response via [facility]? Call back   Best Call Back Number:[phone]    Additional Information:

## 2025-06-17 NOTE — TELEPHONE ENCOUNTER
Copied from CRM #[id number]. Topic: Medications - Medication Refill  >> Jun 17, 2025 11:32 AM Guevara wrote:  Type:  RX Refill Request    Who Called: PT  Refill or New Rx:Refill  RX Name and Strength:  Disp Refills Start End   lisinopriL (PRINIVIL,ZESTRIL) 20 MG tablet 90 tablet 3 7/9/2024 -   Sig - Route: TAKE 1 TABLET(20 MG) BY MOUTH EVERY DAY - Oral   Sent to pharmacy as: lisinopriL (PRINIVIL,ZESTRIL) 20 MG tablet   Notes to Pharmacy: .   E-Prescribing Status: Receipt confirmed by pharmacy (7/9/2024  3:05 PM CDT)     How is the patient currently taking it? (ex. 1XDay):See Above  Is this a 30 day or 90 day RX: 90   Preferred Pharmacy with phone number:   [facility]/pharmacy #[id number] - [place] [address]  [address] [place], [place] [address].  [place] [address]  Phone: [phone] Fax: [phone]      Local or Mail Order:Local   Ordering Provider:NAVA DENG   Would the patient rather a call back or a response via [facility]? Call back   Best Call Back Number:[phone]    Additional Information:

## 2025-06-23 ENCOUNTER — LAB VISIT (OUTPATIENT)
Dept: LAB | Facility: HOSPITAL | Age: 75
End: 2025-06-23
Attending: NURSE PRACTITIONER
Payer: MEDICARE

## 2025-06-23 ENCOUNTER — OFFICE VISIT (OUTPATIENT)
Dept: FAMILY MEDICINE | Facility: CLINIC | Age: 75
End: 2025-06-23
Payer: MEDICARE

## 2025-06-23 VITALS
SYSTOLIC BLOOD PRESSURE: 120 MMHG | HEIGHT: 72 IN | OXYGEN SATURATION: 98 % | BODY MASS INDEX: 38.73 KG/M2 | DIASTOLIC BLOOD PRESSURE: 78 MMHG | WEIGHT: 285.94 LBS | TEMPERATURE: 98 F | HEART RATE: 73 BPM

## 2025-06-23 DIAGNOSIS — R73.03 PREDIABETES: ICD-10-CM

## 2025-06-23 DIAGNOSIS — M51.362 DEGENERATION OF INTERVERTEBRAL DISC OF LUMBAR REGION WITH DISCOGENIC BACK PAIN AND LOWER EXTREMITY PAIN: ICD-10-CM

## 2025-06-23 DIAGNOSIS — Z12.5 PROSTATE CANCER SCREENING: ICD-10-CM

## 2025-06-23 DIAGNOSIS — I25.10 ATHEROSCLEROSIS OF NATIVE CORONARY ARTERY WITHOUT ANGINA PECTORIS, UNSPECIFIED WHETHER NATIVE OR TRANSPLANTED HEART: ICD-10-CM

## 2025-06-23 DIAGNOSIS — N40.0 BENIGN PROSTATIC HYPERPLASIA, UNSPECIFIED WHETHER LOWER URINARY TRACT SYMPTOMS PRESENT: ICD-10-CM

## 2025-06-23 DIAGNOSIS — I25.10 CAD IN NATIVE ARTERY: ICD-10-CM

## 2025-06-23 DIAGNOSIS — I10 ESSENTIAL HYPERTENSION: ICD-10-CM

## 2025-06-23 DIAGNOSIS — I25.5 CARDIOMYOPATHY, ISCHEMIC: Primary | ICD-10-CM

## 2025-06-23 LAB
ABSOLUTE EOSINOPHIL (OHS): 0.17 K/UL
ABSOLUTE MONOCYTE (OHS): 0.67 K/UL (ref 0.3–1)
ABSOLUTE NEUTROPHIL COUNT (OHS): 3.17 K/UL (ref 1.8–7.7)
ALBUMIN SERPL BCP-MCNC: 4 G/DL (ref 3.5–5.2)
ALP SERPL-CCNC: 75 UNIT/L (ref 40–150)
ALT SERPL W/O P-5'-P-CCNC: 41 UNIT/L (ref 10–44)
ANION GAP (OHS): 14 MMOL/L (ref 8–16)
AST SERPL-CCNC: 40 UNIT/L (ref 11–45)
BASOPHILS # BLD AUTO: 0.06 K/UL
BASOPHILS NFR BLD AUTO: 1 %
BILIRUB SERPL-MCNC: 0.5 MG/DL (ref 0.1–1)
BUN SERPL-MCNC: 11 MG/DL (ref 8–23)
CALCIUM SERPL-MCNC: 9.2 MG/DL (ref 8.7–10.5)
CHLORIDE SERPL-SCNC: 103 MMOL/L (ref 95–110)
CHOLEST SERPL-MCNC: 123 MG/DL (ref 120–199)
CHOLEST/HDLC SERPL: 3.5 {RATIO} (ref 2–5)
CO2 SERPL-SCNC: 23 MMOL/L (ref 23–29)
CREAT SERPL-MCNC: 0.9 MG/DL (ref 0.5–1.4)
EAG (OHS): 126 MG/DL (ref 68–131)
ERYTHROCYTE [DISTWIDTH] IN BLOOD BY AUTOMATED COUNT: 13.2 % (ref 11.5–14.5)
GFR SERPLBLD CREATININE-BSD FMLA CKD-EPI: >60 ML/MIN/1.73/M2
GLUCOSE SERPL-MCNC: 99 MG/DL (ref 70–110)
HBA1C MFR BLD: 6 % (ref 4–5.6)
HCT VFR BLD AUTO: 47.2 % (ref 40–54)
HDLC SERPL-MCNC: 35 MG/DL (ref 40–75)
HDLC SERPL: 28.5 % (ref 20–50)
HGB BLD-MCNC: 15.5 GM/DL (ref 14–18)
IMM GRANULOCYTES # BLD AUTO: 0.02 K/UL (ref 0–0.04)
IMM GRANULOCYTES NFR BLD AUTO: 0.3 % (ref 0–0.5)
LDLC SERPL CALC-MCNC: 50 MG/DL (ref 63–159)
LYMPHOCYTES # BLD AUTO: 1.68 K/UL (ref 1–4.8)
MCH RBC QN AUTO: 29.1 PG (ref 27–31)
MCHC RBC AUTO-ENTMCNC: 32.8 G/DL (ref 32–36)
MCV RBC AUTO: 89 FL (ref 82–98)
NONHDLC SERPL-MCNC: 88 MG/DL
NUCLEATED RBC (/100WBC) (OHS): 0 /100 WBC
PLATELET # BLD AUTO: 215 K/UL (ref 150–450)
PMV BLD AUTO: 10.7 FL (ref 9.2–12.9)
POTASSIUM SERPL-SCNC: 4.2 MMOL/L (ref 3.5–5.1)
PROT SERPL-MCNC: 7.1 GM/DL (ref 6–8.4)
PSA SERPL-MCNC: 0.46 NG/ML
RBC # BLD AUTO: 5.33 M/UL (ref 4.6–6.2)
RELATIVE EOSINOPHIL (OHS): 2.9 %
RELATIVE LYMPHOCYTE (OHS): 29.1 % (ref 18–48)
RELATIVE MONOCYTE (OHS): 11.6 % (ref 4–15)
RELATIVE NEUTROPHIL (OHS): 55.1 % (ref 38–73)
SODIUM SERPL-SCNC: 140 MMOL/L (ref 136–145)
TRIGL SERPL-MCNC: 190 MG/DL (ref 30–150)
TSH SERPL-ACNC: 1.02 UIU/ML (ref 0.4–4)
WBC # BLD AUTO: 5.77 K/UL (ref 3.9–12.7)

## 2025-06-23 PROCEDURE — 99214 OFFICE O/P EST MOD 30 MIN: CPT | Mod: PBBFAC,PO | Performed by: NURSE PRACTITIONER

## 2025-06-23 PROCEDURE — 80053 COMPREHEN METABOLIC PANEL: CPT

## 2025-06-23 PROCEDURE — 84153 ASSAY OF PSA TOTAL: CPT

## 2025-06-23 PROCEDURE — 84443 ASSAY THYROID STIM HORMONE: CPT

## 2025-06-23 PROCEDURE — 83036 HEMOGLOBIN GLYCOSYLATED A1C: CPT

## 2025-06-23 PROCEDURE — 99999 PR PBB SHADOW E&M-EST. PATIENT-LVL IV: CPT | Mod: PBBFAC,,, | Performed by: NURSE PRACTITIONER

## 2025-06-23 PROCEDURE — 36415 COLL VENOUS BLD VENIPUNCTURE: CPT | Mod: PO

## 2025-06-23 PROCEDURE — 99214 OFFICE O/P EST MOD 30 MIN: CPT | Mod: S$PBB,,, | Performed by: NURSE PRACTITIONER

## 2025-06-23 PROCEDURE — 80061 LIPID PANEL: CPT

## 2025-06-23 PROCEDURE — 85025 COMPLETE CBC W/AUTO DIFF WBC: CPT

## 2025-06-23 RX ORDER — LISINOPRIL 20 MG/1
20 TABLET ORAL DAILY
Qty: 90 TABLET | Refills: 3 | Status: SHIPPED | OUTPATIENT
Start: 2025-06-23

## 2025-06-23 RX ORDER — PRAVASTATIN SODIUM 40 MG/1
40 TABLET ORAL DAILY
Qty: 90 TABLET | Refills: 3 | Status: SHIPPED | OUTPATIENT
Start: 2025-06-23

## 2025-06-23 RX ORDER — TAMSULOSIN HYDROCHLORIDE 0.4 MG/1
0.4 CAPSULE ORAL DAILY
Qty: 30 CAPSULE | Refills: 11 | Status: SHIPPED | OUTPATIENT
Start: 2025-06-23 | End: 2026-06-23

## 2025-06-23 RX ORDER — GABAPENTIN 600 MG/1
TABLET ORAL
Qty: 90 TABLET | Refills: 3 | Status: SHIPPED | OUTPATIENT
Start: 2025-06-23

## 2025-06-23 RX ORDER — METOPROLOL SUCCINATE 25 MG/1
25 TABLET, EXTENDED RELEASE ORAL DAILY
Qty: 90 TABLET | Refills: 3 | Status: SHIPPED | OUTPATIENT
Start: 2025-06-23

## 2025-06-23 NOTE — PROGRESS NOTES
Subjective:       Patient ID: Pj Malone is a 74 y.o. male.    Chief Complaint: Follow-up (Medication Refill)    HPI check up and med refills.    History of CAD, HTN, Hyperlipidemia, and cardiomyopathy. Ran out of lisinopril, metoprolol , and pravastatin 3 days ago. Last saw cardiology, dr Sanchez, in 2019.    Ran out of gabapentin 2 wks ago. Requests refill for chronic low back pain. Takes gabapentin 600mg only on days he has back pain. Does not take regularly.    Reports nocturia for several years. Has tried cutting back on fluid consumption right before bedtime but still waking up 2-3 times during the night to urinate. Saw urology in 2018 for BPH and flomax was suggested.    Colonoscopy due 4/2026  Past Medical History:   Diagnosis Date    Coronary artery disease     DDD (degenerative disc disease), lumbar     Former smoker     History of colon polyps     Hyperlipidemia     Hypertension     Obesity     bmi 39    Prediabetes        Past Surgical History:   Procedure Laterality Date    CARDIAC CATHETERIZATION      CIRCUMCISION      COLONOSCOPY N/A 05/25/2017    Procedure: COLONOSCOPY;  Surgeon: Viraj Peters MD;  Location: [facility] ENDO;  Service: Endoscopy;  Laterality: N/A;    COLONOSCOPY N/A 4/18/2023    Procedure: COLONOSCOPY;  Surgeon: Salvatore Gates MD;  Location: [facility] ENDO;  Service: Endoscopy;  Laterality: N/A;    COLONOSCOPY W/ POLYPECTOMY  04/18/2023    Dr. Salvatore Gates    JOINT REPLACEMENT Left 2007    left knee    TONSILLECTOMY         Review of patient's allergies indicates:   Allergen Reactions    Poison ivy [vit e-nonoxynol 9-aloe vera] Anaphylaxis       Social History[1]    Medications Ordered Prior to Encounter[2]    Family History   Problem Relation Name Age of Onset    Heart disease Mother      Hypertension Mother      Cancer Father          neck    Heart disease Father      Heart disease Sister      Mental illness Son      No Known Problems Sister      No Known Problems Sister      Colon  polyps Neg Hx      Colon cancer Neg Hx      Crohn's disease Neg Hx      Ulcerative colitis Neg Hx      Esophageal cancer Neg Hx      Stomach cancer Neg Hx         Review of Systems   Constitutional: Negative.    HENT: Negative.     Eyes: Negative.    Respiratory: Negative.     Cardiovascular: Negative.    Gastrointestinal: Negative.    Genitourinary: Negative.    Musculoskeletal: Negative.    Skin: Negative.    Neurological: Negative.    Psychiatric/Behavioral: Negative.         Objective:      /78   Pulse 73   Temp 98 °F (36.7 °C) (Oral)   Ht 6' (1.829 m)   Wt 129.7 kg (285 lb 15 oz)   SpO2 98%   BMI 38.78 kg/m²   Physical Exam  Vitals and nursing note reviewed.   Constitutional:       General: He is not in acute distress.     Appearance: Normal appearance. He is well-groomed. He is obese.   HENT:      Head: Normocephalic.      Right Ear: External ear normal.      Left Ear: External ear normal.      Nose: Nose normal.      Mouth/Throat:      Lips: Pink.      Mouth: Mucous membranes are moist.      Pharynx: Oropharynx is clear. No oropharyngeal exudate or posterior oropharyngeal erythema.   Eyes:      Extraocular Movements: Extraocular movements intact.      Conjunctiva/sclera: Conjunctivae normal.      Pupils: Pupils are equal, round, and reactive to light.   Cardiovascular:      Rate and Rhythm: Normal rate and regular rhythm.      Heart sounds: Normal heart sounds. No murmur heard.  Pulmonary:      Effort: Pulmonary effort is normal.      Breath sounds: Normal breath sounds.   Chest:      Chest wall: No tenderness.   Abdominal:      General: Bowel sounds are normal.      Palpations: Abdomen is soft.      Tenderness: There is no abdominal tenderness.   Musculoskeletal:         General: No swelling or tenderness. Normal range of motion.      Cervical back: Normal range of motion and neck supple.      Right lower leg: No edema.      Left lower leg: No edema.   Lymphadenopathy:      Cervical: No cervical  adenopathy.   Skin:     General: Skin is warm and dry.   Neurological:      General: No focal deficit present.      Mental Status: He is alert and oriented to person, place, and time. Mental status is at baseline.      Gait: Gait is intact.   Psychiatric:         Mood and Affect: Mood normal.         Behavior: Behavior normal.         Assessment:       1. Cardiomyopathy, ischemic    2. Atherosclerosis of native coronary artery without angina pectoris, unspecified whether native or transplanted heart    3. CAD in native artery    4. Essential hypertension    5. DDD (degenerative disc disease), lumbar    6. Prediabetes    7. Prostate cancer screening    8. Benign prostatic hyperplasia, unspecified whether lower urinary tract symptoms present        Plan:       Cardiomyopathy, ischemic  -     Ambulatory referral/consult to Cardiology; Future; Expected date: 06/30/2025    Atherosclerosis of native coronary artery without angina pectoris, unspecified whether native or transplanted heart  -     Ambulatory referral/consult to Cardiology; Future; Expected date: 06/30/2025  -     pravastatin (PRAVACHOL) 40 MG tablet; Take 1 tablet (40 mg total) by mouth once daily.  Dispense: 90 tablet; Refill: 3    CAD in native artery  -     Ambulatory referral/consult to Cardiology; Future; Expected date: 06/30/2025    Essential hypertension  -     metoprolol succinate (TOPROL-XL) 25 MG 24 hr tablet; Take 1 tablet (25 mg total) by mouth once daily.  Dispense: 90 tablet; Refill: 3  -     lisinopriL (PRINIVIL,ZESTRIL) 20 MG tablet; Take 1 tablet (20 mg total) by mouth once daily.  Dispense: 90 tablet; Refill: 3  -     CBC W/ AUTO DIFFERENTIAL; Future; Expected date: 06/23/2025  -     Comprehensive Metabolic Panel; Future; Expected date: 06/23/2025  -     TSH; Future; Expected date: 06/23/2025  -     LIPID PANEL; Future; Expected date: 06/23/2025    DDD (degenerative disc disease), lumbar  -     gabapentin (NEURONTIN) 600 MG tablet; TAKE 1  TABLET(600 MG) BY MOUTH DAILY AS NEEDED FOR BACK PAIN  Dispense: 90 tablet; Refill: 3    Prediabetes  -     HEMOGLOBIN A1C; Future; Expected date: 06/23/2025    Prostate cancer screening  -     PSA, SCREENING; Future; Expected date: 06/23/2025    Benign prostatic hyperplasia, unspecified whether lower urinary tract symptoms present  -     tamsulosin (FLOMAX) 0.4 mg Cap; Take 1 capsule (0.4 mg total) by mouth once daily.  Dispense: 30 capsule; Refill: 11        HTN stable. Continue metoprolol and lisinopril. Refer to cardiology for history of CAD and cardiomyopathy    Hyperlipidemia: continue statin.    Back pain: continue gabapentin prn    BPH: start flomax. RTC if no improvement    RTC 6 mos.    Counseled on regular exercise, maintenance of a healthy weight, balanced diet rich in fruits/vegetables and lean protein, and avoidance of unhealthy habits like smoking and excessive alcohol intake.         [1]   Social History  Socioeconomic History    Marital status:     Number of children: 1   Occupational History    Occupation: retired    Tobacco Use    Smoking status: Former     Current packs/day: 0.50     Average packs/day: 0.5 packs/day for 6.0 years (3.0 ttl pk-yrs)     Types: Cigarettes    Smokeless tobacco: Never   Substance and Sexual Activity    Alcohol use: Yes     Comment: occas- 12pack/month    Drug use: Yes     Types: Marijuana     Comment: occas marijuana use   Social History Narrative    Retired.  for [employer]      Social Drivers of Health     Financial Resource Strain: Low Risk  (9/24/2024)    Overall Financial Resource Strain (CARDIA)     Difficulty of Paying Living Expenses: Not hard at all   Food Insecurity: No Food Insecurity (9/24/2024)    Hunger Vital Sign     Worried About Running Out of Food in the Last Year: Never true     Ran Out of Food in the Last Year: Never true   Transportation Needs: No Transportation Needs (9/24/2024)    PRAPARE -  Transportation     Lack of Transportation (Medical): No     Lack of Transportation (Non-Medical): No   Physical Activity: Inactive (9/24/2024)    Exercise Vital Sign     Days of Exercise per Week: 0 days     Minutes of Exercise per Session: 0 min   Stress: Stress Concern Present (9/24/2024)    [place] [place] [employer] - Occupational Stress Questionnaire     Feeling of Stress : Rather much   Housing Stability: Unknown (9/24/2024)    Housing Stability Vital Sign     Unable to Pay for Housing in the Last Year: No     Homeless in the Last Year: No   [2]   Current Outpatient Medications on File Prior to Visit   Medication Sig Dispense Refill    aspirin (ECOTRIN) 81 MG EC tablet Take 81 mg by mouth once daily.      [DISCONTINUED] gabapentin (NEURONTIN) 600 MG tablet TAKE 1 TABLET(600 MG) BY MOUTH DAILY AS NEEDED FOR BACK PAIN 90 tablet 3    [DISCONTINUED] lisinopriL (PRINIVIL,ZESTRIL) 20 MG tablet TAKE 1 TABLET(20 MG) BY MOUTH EVERY DAY 90 tablet 3    [DISCONTINUED] metoprolol succinate (TOPROL-XL) 25 MG 24 hr tablet TAKE 1 TABLET(25 MG) BY MOUTH EVERY DAY 90 tablet 3    [DISCONTINUED] pravastatin (PRAVACHOL) 40 MG tablet TAKE 1 TABLET(40 MG) BY MOUTH EVERY DAY 90 tablet 3     No current facility-administered medications on file prior to visit.

## 2025-06-24 ENCOUNTER — RESULTS FOLLOW-UP (OUTPATIENT)
Dept: FAMILY MEDICINE | Facility: CLINIC | Age: 75
End: 2025-06-24

## 2025-06-24 ENCOUNTER — TELEPHONE (OUTPATIENT)
Dept: FAMILY MEDICINE | Facility: CLINIC | Age: 75
End: 2025-06-24
Payer: MEDICARE

## 2025-06-24 NOTE — TELEPHONE ENCOUNTER
----- Message from Kathe Benites NP sent at 6/24/2025  9:20 AM CDT -----  Labs are all stable. Repeat labs in 6 months.  ----- Message -----  From: Lab, Background User  Sent: 6/23/2025   8:47 PM CDT  To: Kathe Benites NP

## 2025-07-09 ENCOUNTER — TELEPHONE (OUTPATIENT)
Dept: CARDIOLOGY | Facility: CLINIC | Age: 75
End: 2025-07-09
Payer: MEDICARE

## 2025-07-10 ENCOUNTER — OFFICE VISIT (OUTPATIENT)
Dept: CARDIOLOGY | Facility: CLINIC | Age: 75
End: 2025-07-10
Payer: MEDICARE

## 2025-07-10 VITALS
BODY MASS INDEX: 38.79 KG/M2 | WEIGHT: 286.38 LBS | HEIGHT: 72 IN | SYSTOLIC BLOOD PRESSURE: 109 MMHG | HEART RATE: 77 BPM | DIASTOLIC BLOOD PRESSURE: 75 MMHG

## 2025-07-10 DIAGNOSIS — E66.01 MORBID OBESITY: Chronic | ICD-10-CM

## 2025-07-10 DIAGNOSIS — I25.5 CARDIOMYOPATHY, ISCHEMIC: ICD-10-CM

## 2025-07-10 DIAGNOSIS — R94.31 NONSPECIFIC ABNORMAL ELECTROCARDIOGRAM (ECG) (EKG): ICD-10-CM

## 2025-07-10 DIAGNOSIS — I10 PRIMARY HYPERTENSION: Chronic | ICD-10-CM

## 2025-07-10 DIAGNOSIS — I25.10 CAD IN NATIVE ARTERY: Primary | Chronic | ICD-10-CM

## 2025-07-10 DIAGNOSIS — I34.0 NONRHEUMATIC MITRAL VALVE REGURGITATION: Chronic | ICD-10-CM

## 2025-07-10 DIAGNOSIS — R09.89 ARTERIAL BRUIT: ICD-10-CM

## 2025-07-10 DIAGNOSIS — R06.09 DOE (DYSPNEA ON EXERTION): ICD-10-CM

## 2025-07-10 DIAGNOSIS — E78.5 DYSLIPIDEMIA: Chronic | ICD-10-CM

## 2025-07-10 LAB
OHS QRS DURATION: 92 MS
OHS QTC CALCULATION: 418 MS

## 2025-07-10 PROCEDURE — 93005 ELECTROCARDIOGRAM TRACING: CPT | Mod: PO

## 2025-07-10 PROCEDURE — 99999 PR PBB SHADOW E&M-EST. PATIENT-LVL III: CPT | Mod: PBBFAC,,, | Performed by: INTERNAL MEDICINE

## 2025-07-10 PROCEDURE — 99213 OFFICE O/P EST LOW 20 MIN: CPT | Mod: PBBFAC,PO,25 | Performed by: INTERNAL MEDICINE

## 2025-07-10 NOTE — PROGRESS NOTES
Subjective:    Patient ID:  Pj Malone is a 74 y.o. male who presents for Establish Care, Coronary Artery Disease, and Heart Problem        HPI  NEW PATIENT EVALUATION, WAS FOLLOWED BY DR. TEIXEIRA IN THE PAST, HISTORY OF CAD OCCLUDED RCA WITH COLLATERAL FLOW MILD DISEASE IN THE LEFT CORONARY SYSTEM, ISCHEMIC CARDIOMYOPATHY HOWEVER LAST ECHO FROM 2019 REPORTED NORMAL LV FUNCTION COVER THAT IS SHOWED MILD MR, RECENT LABS LDL 50, HDL 35, TRIGLYCERIDE 190, HBA1C 6%, CMP AND TSH NORMAL, ABDOMINAL ULTRASOUND IN 2020 NO AAA, HAS BEEN DOING OK NO CHEST PAIN BUT HAS BEEN HAVING SOME DYSPNEA ON EXERTION, PARTIALLY RELATED TO WEIGHT, SEE REVIEW OF SYSTEMS    Past Medical History:   Diagnosis Date    Coronary artery disease     DDD (degenerative disc disease), lumbar     Former smoker     History of colon polyps     Hyperlipidemia     Hypertension     Obesity     bmi 39    Prediabetes      Past Surgical History:   Procedure Laterality Date    CARDIAC CATHETERIZATION      CIRCUMCISION      COLONOSCOPY N/A 05/25/2017    Procedure: COLONOSCOPY;  Surgeon: Viraj Peters MD;  Location: [facility] ENDO;  Service: Endoscopy;  Laterality: N/A;    COLONOSCOPY N/A 4/18/2023    Procedure: COLONOSCOPY;  Surgeon: Salvatore Gates MD;  Location: [facility] ENDO;  Service: Endoscopy;  Laterality: N/A;    COLONOSCOPY W/ POLYPECTOMY  04/18/2023    Dr. Salvatore Gates    JOINT REPLACEMENT Left 2007    left knee    TONSILLECTOMY       Family History   Problem Relation Name Age of Onset    Heart disease Mother      Hypertension Mother      Cancer Father          neck    Heart disease Father      Heart disease Sister      Mental illness Son      No Known Problems Sister      No Known Problems Sister      Colon polyps Neg Hx      Colon cancer Neg Hx      Crohn's disease Neg Hx      Ulcerative colitis Neg Hx      Esophageal cancer Neg Hx      Stomach cancer Neg Hx       Social History     Socioeconomic History    Marital status:      Number of children: 1   Occupational History    Occupation: retired    Tobacco Use    Smoking status: Former     Current packs/day: 0.50     Average packs/day: 0.5 packs/day for 6.0 years (3.0 ttl pk-yrs)     Types: Cigarettes    Smokeless tobacco: Never   Substance and Sexual Activity    Alcohol use: Yes     Comment: occas- 12pack/month    Drug use: Yes     Types: Marijuana     Comment: occas marijuana use   Social History Narrative    Retired.  for [employer]      Social Drivers of Health     Financial Resource Strain: Low Risk  (9/24/2024)    Overall Financial Resource Strain (CARDIA)     Difficulty of Paying Living Expenses: Not hard at all   Food Insecurity: No Food Insecurity (9/24/2024)    Hunger Vital Sign     Worried About Running Out of Food in the Last Year: Never true     Ran Out of Food in the Last Year: Never true   Transportation Needs: No Transportation Needs (9/24/2024)    PRAPARE - Transportation     Lack of Transportation (Medical): No     Lack of Transportation (Non-Medical): No   Physical Activity: Inactive (9/24/2024)    Exercise Vital Sign     Days of Exercise per Week: 0 days     Minutes of Exercise per Session: 0 min   Stress: Stress Concern Present (9/24/2024)    [employer] - Occupational Stress Questionnaire     Feeling of Stress : Rather much   Housing Stability: Unknown (9/24/2024)    Housing Stability Vital Sign     Unable to Pay for Housing in the Last Year: No     Homeless in the Last Year: No       Review of patient's allergies indicates:   Allergen Reactions    Poison ivy [vit e-nonoxynol 9-aloe vera] Anaphylaxis     Current Medications[1]    Review of Systems   Constitutional: Positive for malaise/fatigue. Negative for chills, diaphoresis, fever and night sweats.   HENT:  Negative for congestion, hearing loss (some) and nosebleeds.    Eyes:  Negative for blurred vision and visual  disturbance.   Cardiovascular:  Positive for dyspnea on exertion (mild). Negative for chest pain, claudication, cyanosis, irregular heartbeat, leg swelling, near-syncope, orthopnea, palpitations, paroxysmal nocturnal dyspnea and syncope.   Respiratory:  Negative for cough, hemoptysis, shortness of breath and wheezing.    Endocrine: Negative for cold intolerance, heat intolerance and polyuria.   Hematologic/Lymphatic: Negative for adenopathy. Does not bruise/bleed easily.   Skin:  Negative for color change, itching and nail changes.   Musculoskeletal:  Negative for back pain, falls and joint pain (occ feet).   Gastrointestinal:  Negative for abdominal pain, change in bowel habit, dysphagia, flatus, heartburn, hematemesis, jaundice, melena and vomiting.   Genitourinary:  Negative for dysuria and flank pain.   Neurological:  Negative for brief paralysis, dizziness, focal weakness, light-headedness, loss of balance, numbness, tremors and weakness.   Psychiatric/Behavioral:  Positive for substance abuse (weed at times). Negative for altered mental status, depression and memory loss. The patient is not nervous/anxious.    Allergic/Immunologic: Negative for persistent infections.        Objective:      Vitals:    07/10/25 1051   BP: 109/75   Pulse: 77   Weight: 129.9 kg (286 lb 6 oz)   Height: 6' (1.829 m)   PainSc: 0-No pain     Body mass index is 38.84 kg/m².    Physical Exam  Constitutional:       Appearance: He is obese.   Neck:      Vascular: Carotid bruit present.   Cardiovascular:      Rate and Rhythm: Normal rate and regular rhythm.      Pulses:           Carotid pulses are 2+ on the right side and 2+ on the left side.       Radial pulses are 2+ on the right side and 2+ on the left side.        Posterior tibial pulses are 2+ on the right side and 2+ on the left side.      Heart sounds: Murmur heard.      Systolic murmur is present with a grade of 1/6 at the lower left sternal border.   Pulmonary:      Effort: No  respiratory distress.      Breath sounds: Normal breath sounds and air entry.   Chest:      Chest wall: No tenderness.   Abdominal:      Palpations: Abdomen is soft. There is no hepatomegaly.      Tenderness: There is no abdominal tenderness.      Comments: Obese abd   Musculoskeletal:      Cervical back: Neck supple.   Neurological:      Mental Status: He is alert and oriented to person, place, and time.   Psychiatric:         Mood and Affect: Mood normal.         Speech: Speech normal.         Behavior: Behavior normal.             ..    Chemistry        Component Value Date/Time     06/23/2025 1602     05/01/2024 1413    K 4.2 06/23/2025 1602    K 4.2 05/01/2024 1413     06/23/2025 1602     05/01/2024 1413    CO2 23 06/23/2025 1602    CO2 21 (L) 05/01/2024 1413    BUN 11 06/23/2025 1602    CREATININE 0.9 06/23/2025 1602    GLU 99 06/23/2025 1602     05/01/2024 1413        Component Value Date/Time    CALCIUM 9.2 06/23/2025 1602    CALCIUM 9.5 05/01/2024 1413    ALKPHOS 75 06/23/2025 1602    ALKPHOS 85 05/01/2024 1413    AST 40 06/23/2025 1602    AST 19 05/01/2024 1413    ALT 41 06/23/2025 1602    ALT 23 05/01/2024 1413    BILITOT 0.5 06/23/2025 1602    BILITOT 0.4 05/01/2024 1413    ESTGFRAFRICA >60.0 04/14/2022 0916    EGFRNONAA >60.0 04/14/2022 0916            ..  Lab Results   Component Value Date    CHOL 123 06/23/2025    CHOL 150 05/01/2024    CHOL 161 10/30/2023     Lab Results   Component Value Date    HDL 35 (L) 06/23/2025    HDL 41 05/01/2024    HDL 43 10/30/2023     Lab Results   Component Value Date    LDLCALC 50.0 (L) 06/23/2025    LDLCALC 60.2 (L) 05/01/2024    LDLCALC 79.2 10/30/2023     Lab Results   Component Value Date    TRIG 190 (H) 06/23/2025    TRIG 244 (H) 05/01/2024    TRIG 194 (H) 10/30/2023     Lab Results   Component Value Date    CHOLHDL 28.5 06/23/2025    CHOLHDL 27.3 05/01/2024    CHOLHDL 26.7 10/30/2023     ..  Lab Results   Component Value Date    WBC  5.77 06/23/2025    HGB 15.5 06/23/2025    HCT 47.2 06/23/2025    MCV 89 06/23/2025     06/23/2025       Test(s) Reviewed  I have reviewed the following in detail:  [] Stress test   [] Angiography   [x] Echocardiogram   [x] Labs   [x] Other:       Assessment:         ICD-10-CM ICD-9-CM   1. CAD in native artery  I25.10 414.01   2. Nonrheumatic mitral valve regurgitation  I34.0 424.0   3. VALDES (dyspnea on exertion)  R06.09 786.09   4. Cardiomyopathy, ischemic  I25.5 414.8   5. Morbid obesity  E66.01 278.01   6. Dyslipidemia  E78.5 272.4   7. Primary hypertension  I10 401.9   8. Arterial bruit  R09.89 785.9   9. Nonspecific abnormal electrocardiogram (ECG) (EKG)  R94.31 794.31     Problem List Items Addressed This Visit          Cardiac/Vascular    CAD in native artery - occluded RCA with collaterals; mild non-obstructive Dz in LAD, LCX - cath 2007 - Primary    Relevant Orders    IN OFFICE EKG 12-LEAD (to [place]) (Completed)    Nuclear Stress - Cardiology Interpreted    Echo    Cardiomyopathy, ischemic - EF 35-40% ---> 55% echo 03/2013    Relevant Orders    IN OFFICE EKG 12-LEAD (to [facility]) (Completed)    Nuclear Stress - Cardiology Interpreted    Hypertension    Dyslipidemia    Nonrheumatic mitral valve regurgitation    Relevant Orders    IN OFFICE EKG 12-LEAD (to [place]) (Completed)    Echo    VALDES (dyspnea on exertion)    Relevant Orders    Nuclear Stress - Cardiology Interpreted    Arterial bruit    Relevant Orders    CV Ultrasound Bilateral Doppler Carotid    Nonspecific abnormal electrocardiogram (ECG) (EKG)       Endocrine    Morbid obesity        Plan:       THE EKG SINUS RHYTHM NONSPECIFIC T-WAVE CHANGES WILL NEED FURTHER EVALUATION CHECK ECHOCARDIOGRAM REASSESS EF MITRAL REGURGITATION, NUCLEAR STRESS TEST ASSESS FOR ISCHEMIA THIS PATIENT WITH ANGINA EQUIVALENT, CHECK CAROTID ULTRASOUND  ALL OTHER CV CLINICALLY STABLE, ASSESS EQUIVALENT ANGINA, NO HF, NO TIA, NO CLINICAL ARRHYTHMIA,CONTINUE CURRENT MEDS,  EDUCATION, DIET, EXERCISE , NEEDS SIGNIFICANT WEIGHT LOSS RETURN TO CLINIC IN FEW WEEKS AFTER TESTS        CAD in native artery  -     Ambulatory referral/consult to Cardiology  -     IN OFFICE EKG 12-LEAD (to [facility])  -     Nuclear Stress - Cardiology Interpreted; Future  -     Echo    Nonrheumatic mitral valve regurgitation  -     Ambulatory referral/consult to Cardiology  -     IN OFFICE EKG 12-LEAD (to [place])  -     Echo    VALDES (dyspnea on exertion)  -     Nuclear Stress - Cardiology Interpreted; Future    Cardiomyopathy, ischemic  -     Ambulatory referral/consult to Cardiology  -     IN OFFICE EKG 12-LEAD (to [facility])  -     Nuclear Stress - Cardiology Interpreted; Future    Morbid obesity    Dyslipidemia    Primary hypertension    Arterial bruit  Comments:  us  Orders:  -     CV Ultrasound Bilateral Doppler Carotid; Future    Nonspecific abnormal electrocardiogram (ECG) (EKG)    RTC Low level/low impact aerobic exercise 5x's/wk. Heart healthy diet and risk factor modification.    See labs and med orders.    Aerobic exercise 5x's/wk. Heart healthy diet and risk factor modification.    See labs and med orders.               [1]    Current Outpatient Medications:     aspirin (ECOTRIN) 81 MG EC tablet, Take 81 mg by mouth once daily., Disp: , Rfl:     gabapentin (NEURONTIN) 600 MG tablet, TAKE 1 TABLET(600 MG) BY MOUTH DAILY AS NEEDED FOR BACK PAIN, Disp: 90 tablet, Rfl: 3    lisinopriL (PRINIVIL,ZESTRIL) 20 MG tablet, Take 1 tablet (20 mg total) by mouth once daily., Disp: 90 tablet, Rfl: 3    metoprolol succinate (TOPROL-XL) 25 MG 24 hr tablet, Take 1 tablet (25 mg total) by mouth once daily., Disp: 90 tablet, Rfl: 3    pravastatin (PRAVACHOL) 40 MG tablet, Take 1 tablet (40 mg total) by mouth once daily., Disp: 90 tablet, Rfl: 3    tamsulosin (FLOMAX) 0.4 mg Cap, Take 1 capsule (0.4 mg total) by mouth once daily., Disp: 30 capsule, Rfl: 11

## 2025-07-17 ENCOUNTER — HOSPITAL ENCOUNTER (OUTPATIENT)
Dept: CARDIOLOGY | Facility: HOSPITAL | Age: 75
Discharge: HOME | End: 2025-07-17
Attending: INTERNAL MEDICINE
Payer: MEDICARE

## 2025-07-17 VITALS — HEIGHT: 72 IN | HEART RATE: 61 BPM | BODY MASS INDEX: 38.74 KG/M2 | WEIGHT: 286 LBS

## 2025-07-17 DIAGNOSIS — R09.89 ARTERIAL BRUIT: ICD-10-CM

## 2025-07-17 LAB
LEFT CBA DIAS: 17 CM/S
LEFT CBA SYS: 82 CM/S
LEFT CCA DIST DIAS: 25 CM/S
LEFT CCA DIST SYS: 90 CM/S
LEFT CCA MID DIAS: 12 CM/S
LEFT CCA MID SYS: 89 CM/S
LEFT CCA PROX DIAS: 12 CM/S
LEFT CCA PROX SYS: 63 CM/S
LEFT ECA DIAS: 6 CM/S
LEFT ECA SYS: 58 CM/S
LEFT ICA DIST DIAS: 22 CM/S
LEFT ICA DIST SYS: 45 CM/S
LEFT ICA MID DIAS: 14 CM/S
LEFT ICA MID SYS: 35 CM/S
LEFT ICA PROX DIAS: 19 CM/S
LEFT ICA PROX SYS: 48 CM/S
LEFT VERTEBRAL DIAS: 13 CM/S
LEFT VERTEBRAL SYS: 34 CM/S
OHS CV CAROTID RIGHT ICA EDV HIGHEST: 24
OHS CV CAROTID ULTRASOUND LEFT ICA/CCA RATIO: 0.53
OHS CV CAROTID ULTRASOUND RIGHT ICA/CCA RATIO: 0.77
OHS CV CPX PATIENT HEIGHT IN: 72
OHS CV PV CAROTID LEFT HIGHEST CCA: 90
OHS CV PV CAROTID LEFT HIGHEST ICA: 48
OHS CV PV CAROTID RIGHT HIGHEST CCA: 98
OHS CV PV CAROTID RIGHT HIGHEST ICA: 56
OHS CV US CAROTID LEFT HIGHEST EDV: 22
RIGHT CBA DIAS: 13 CM/S
RIGHT CBA SYS: 44 CM/S
RIGHT CCA DIST DIAS: 18 CM/S
RIGHT CCA DIST SYS: 73 CM/S
RIGHT CCA MID DIAS: 23 CM/S
RIGHT CCA MID SYS: 98 CM/S
RIGHT CCA PROX DIAS: 15 CM/S
RIGHT CCA PROX SYS: 63 CM/S
RIGHT ECA DIAS: 15 CM/S
RIGHT ECA SYS: 84 CM/S
RIGHT ICA DIST DIAS: 18 CM/S
RIGHT ICA DIST SYS: 39 CM/S
RIGHT ICA MID DIAS: 24 CM/S
RIGHT ICA MID SYS: 56 CM/S
RIGHT ICA PROX DIAS: 19 CM/S
RIGHT ICA PROX SYS: 41 CM/S
RIGHT VERTEBRAL DIAS: 17 CM/S
RIGHT VERTEBRAL SYS: 55 CM/S

## 2025-07-17 PROCEDURE — 93306 TTE W/DOPPLER COMPLETE: CPT | Mod: 26,,, | Performed by: INTERNAL MEDICINE

## 2025-07-17 PROCEDURE — 93880 EXTRACRANIAL BILAT STUDY: CPT | Mod: PO

## 2025-07-17 PROCEDURE — 93880 EXTRACRANIAL BILAT STUDY: CPT | Mod: 26,,, | Performed by: INTERNAL MEDICINE

## 2025-07-17 PROCEDURE — 93306 TTE W/DOPPLER COMPLETE: CPT | Mod: PO

## 2025-07-30 ENCOUNTER — HOSPITAL ENCOUNTER (OUTPATIENT)
Dept: RADIOLOGY | Facility: HOSPITAL | Age: 75
Discharge: HOME | End: 2025-07-30
Attending: INTERNAL MEDICINE
Payer: MEDICARE

## 2025-07-30 ENCOUNTER — HOSPITAL ENCOUNTER (OUTPATIENT)
Dept: CARDIOLOGY | Facility: HOSPITAL | Age: 75
Discharge: HOME | End: 2025-07-30
Attending: INTERNAL MEDICINE
Payer: MEDICARE

## 2025-07-30 VITALS — HEIGHT: 72 IN | BODY MASS INDEX: 38.74 KG/M2 | WEIGHT: 286 LBS

## 2025-07-30 DIAGNOSIS — R06.09 DOE (DYSPNEA ON EXERTION): ICD-10-CM

## 2025-07-30 DIAGNOSIS — I25.10 CAD IN NATIVE ARTERY: Chronic | ICD-10-CM

## 2025-07-30 DIAGNOSIS — I25.5 CARDIOMYOPATHY, ISCHEMIC: ICD-10-CM

## 2025-07-30 LAB
CV PHARM DOSE: 0.4 MG
CV STRESS BASE HR: 68 BPM
DIASTOLIC BLOOD PRESSURE: 87 MMHG
NUC REST EJECTION FRACTION: 51
OHS CV CPX 1 MINUTE RECOVERY HEART RATE: 81 BPM
OHS CV CPX 85 PERCENT MAX PREDICTED HEART RATE MALE: 126
OHS CV CPX MAX PREDICTED HEART RATE: 148
OHS CV CPX PATIENT HEIGHT IN: 72
OHS CV CPX PATIENT IS FEMALE: 0
OHS CV CPX PATIENT IS MALE: 1
OHS CV CPX PEAK DIASTOLIC BLOOD PRESSURE: 87 MMHG
OHS CV CPX PEAK HEAR RATE: 83 BPM
OHS CV CPX PEAK RATE PRESSURE PRODUCT: NORMAL
OHS CV CPX PEAK SYSTOLIC BLOOD PRESSURE: 156 MMHG
OHS CV CPX PERCENT MAX PREDICTED HEART RATE ACHIEVED: 56
OHS CV CPX RATE PRESSURE PRODUCT PRESENTING: NORMAL
OHS CV INITIAL DOSE: 16.3 MCG/KG/MIN
OHS CV PEAK DOSE: 46 MCG/KG/MIN
OHS CV PHARM TIME: 1417 MIN
SYSTOLIC BLOOD PRESSURE: 156 MMHG

## 2025-07-30 PROCEDURE — 63600175 PHARM REV CODE 636 W HCPCS: Mod: PO | Performed by: INTERNAL MEDICINE

## 2025-07-30 PROCEDURE — 93017 CV STRESS TEST TRACING ONLY: CPT | Mod: PBBFAC,PO

## 2025-07-30 PROCEDURE — 93017 CV STRESS TEST TRACING ONLY: CPT | Mod: PO

## 2025-07-30 PROCEDURE — 78452 HT MUSCLE IMAGE SPECT MULT: CPT | Mod: 26,,, | Performed by: INTERNAL MEDICINE

## 2025-07-30 PROCEDURE — 93016 CV STRESS TEST SUPVJ ONLY: CPT | Mod: S$PBB,,, | Performed by: INTERNAL MEDICINE

## 2025-07-30 PROCEDURE — A9502 TC99M TETROFOSMIN: HCPCS | Mod: PO | Performed by: INTERNAL MEDICINE

## 2025-07-30 PROCEDURE — 93018 CV STRESS TEST I&R ONLY: CPT | Mod: S$PBB,,, | Performed by: INTERNAL MEDICINE

## 2025-07-30 PROCEDURE — 78452 HT MUSCLE IMAGE SPECT MULT: CPT | Mod: PO

## 2025-07-30 RX ORDER — REGADENOSON 0.08 MG/ML
0.4 INJECTION, SOLUTION INTRAVENOUS
Status: COMPLETED | OUTPATIENT
Start: 2025-07-30 | End: 2025-07-30

## 2025-07-30 RX ADMIN — REGADENOSON 0.4 MG: 0.08 INJECTION, SOLUTION INTRAVENOUS at 02:07

## 2025-07-30 RX ADMIN — TETROFOSMIN 46 MILLICURIE: 1.38 INJECTION, POWDER, LYOPHILIZED, FOR SOLUTION INTRAVENOUS at 02:07

## 2025-07-30 RX ADMIN — TETROFOSMIN 16.3 MILLICURIE: 1.38 INJECTION, POWDER, LYOPHILIZED, FOR SOLUTION INTRAVENOUS at 02:07

## 2025-08-25 ENCOUNTER — TELEPHONE (OUTPATIENT)
Dept: CARDIOLOGY | Facility: CLINIC | Age: 75
End: 2025-08-25
Payer: MEDICARE

## 2025-08-25 DIAGNOSIS — Z00.00 ENCOUNTER FOR MEDICARE ANNUAL WELLNESS EXAM: ICD-10-CM
